# Patient Record
Sex: FEMALE | Race: BLACK OR AFRICAN AMERICAN | NOT HISPANIC OR LATINO | Employment: OTHER | URBAN - METROPOLITAN AREA
[De-identification: names, ages, dates, MRNs, and addresses within clinical notes are randomized per-mention and may not be internally consistent; named-entity substitution may affect disease eponyms.]

---

## 2017-09-14 PROBLEM — H02.886 MEIBOMIAN GLAND DYSFUNCTION (MGD) OF BOTH EYES: Status: ACTIVE | Noted: 2017-09-14

## 2017-09-14 PROBLEM — H02.883 MEIBOMIAN GLAND DYSFUNCTION (MGD) OF BOTH EYES: Status: ACTIVE | Noted: 2017-09-14

## 2017-09-14 PROBLEM — H35.9 WHITE WITHOUT PRESSURE OF PERIPHERAL RETINA OF BOTH EYES: Status: ACTIVE | Noted: 2017-09-14

## 2017-10-18 PROBLEM — D64.9 ANEMIA: Status: ACTIVE | Noted: 2017-10-18

## 2017-10-18 PROBLEM — I77.9 LEFT-SIDED CAROTID ARTERY DISEASE: Status: ACTIVE | Noted: 2017-10-18

## 2017-12-04 PROBLEM — R30.0 DYSURIA: Status: ACTIVE | Noted: 2017-12-04

## 2018-04-09 PROBLEM — K21.9 GERD (GASTROESOPHAGEAL REFLUX DISEASE): Status: ACTIVE | Noted: 2018-04-09

## 2018-05-23 PROBLEM — K62.5 BRIGHT RED BLOOD PER RECTUM: Status: ACTIVE | Noted: 2018-05-23

## 2018-08-17 PROBLEM — Z12.11 SCREENING FOR COLON CANCER: Status: ACTIVE | Noted: 2018-08-17

## 2018-11-05 PROBLEM — R05.3 CHRONIC COUGH: Status: ACTIVE | Noted: 2018-11-05

## 2018-11-05 PROBLEM — I73.9 CLAUDICATION: Status: ACTIVE | Noted: 2018-11-05

## 2018-11-26 PROBLEM — Z00.00 HEALTHCARE MAINTENANCE: Status: ACTIVE | Noted: 2018-11-26

## 2019-02-25 PROBLEM — Z00.00 HEALTHCARE MAINTENANCE: Status: RESOLVED | Noted: 2018-11-26 | Resolved: 2019-02-25

## 2019-04-29 ENCOUNTER — HOSPITAL ENCOUNTER (OUTPATIENT)
Dept: SLEEP MEDICINE | Facility: HOSPITAL | Age: 64
Discharge: HOME | End: 2019-04-29
Attending: INTERNAL MEDICINE
Payer: MEDICARE

## 2019-04-29 DIAGNOSIS — G47.33 OBSTRUCTIVE SLEEP APNEA (ADULT) (PEDIATRIC): ICD-10-CM

## 2019-04-29 PROCEDURE — 95810 PR POLYSOMNOGRAPHY, 4 OR MORE: ICD-10-PCS | Mod: 26,,, | Performed by: INTERNAL MEDICINE

## 2019-04-29 PROCEDURE — 95810 POLYSOM 6/> YRS 4/> PARAM: CPT

## 2019-04-29 PROCEDURE — 95810 POLYSOM 6/> YRS 4/> PARAM: CPT | Mod: 26,,, | Performed by: INTERNAL MEDICINE

## 2019-05-22 PROBLEM — Z13.1 SCREENING FOR DIABETES MELLITUS: Status: ACTIVE | Noted: 2019-05-22

## 2019-05-29 PROBLEM — I50.20 HEART FAILURE WITH REDUCED EJECTION FRACTION, NYHA CLASS III: Status: ACTIVE | Noted: 2019-05-29

## 2019-05-29 PROBLEM — G47.33 OSA ON CPAP: Status: ACTIVE | Noted: 2019-05-29

## 2019-06-02 ENCOUNTER — HOSPITAL ENCOUNTER (OUTPATIENT)
Dept: SLEEP MEDICINE | Facility: HOSPITAL | Age: 64
Discharge: HOME | End: 2019-06-02
Attending: INTERNAL MEDICINE
Payer: MEDICARE

## 2019-06-02 DIAGNOSIS — G47.33 OBSTRUCTIVE SLEEP APNEA (ADULT) (PEDIATRIC): ICD-10-CM

## 2019-06-02 PROCEDURE — 95811 POLYSOM 6/>YRS CPAP 4/> PARM: CPT | Mod: 26,,, | Performed by: INTERNAL MEDICINE

## 2019-06-02 PROCEDURE — 95811 PR POLYSOMNOGRAPHY W/CPAP: ICD-10-PCS | Mod: 26,,, | Performed by: INTERNAL MEDICINE

## 2019-06-02 PROCEDURE — 95811 POLYSOM 6/>YRS CPAP 4/> PARM: CPT

## 2019-08-26 PROBLEM — Z00.00 HEALTHCARE MAINTENANCE: Status: RESOLVED | Noted: 2018-11-26 | Resolved: 2019-08-26

## 2019-08-26 PROBLEM — Z13.1 SCREENING FOR DIABETES MELLITUS: Status: RESOLVED | Noted: 2019-05-22 | Resolved: 2019-08-26

## 2020-01-02 PROBLEM — L03.113 CELLULITIS OF RIGHT FOREARM: Status: ACTIVE | Noted: 2020-01-02

## 2020-01-08 ENCOUNTER — PATIENT OUTREACH (OUTPATIENT)
Dept: ADMINISTRATIVE | Facility: CLINIC | Age: 65
End: 2020-01-08

## 2020-01-08 NOTE — PATIENT INSTRUCTIONS
Discharge Instructions for Cellulitis  You have been diagnosed with cellulitis. This is an infection in the deepest layer of the skin. In some cases, the infection also affects the muscle. Cellulitis is caused by bacteria. The bacteria can enter the body through broken skin. This can happen with a cut, scratch, animal bite, or an insect bite that has been scratched. You may have been treated in the hospital with antibiotics and fluids. You will likely be given a prescription for antibiotics to take at home. This sheet will help you take care of yourself at home.  Home care  When you are home:  · Take the prescribed antibiotic medicine you are given as directed until it is gone. Take it even if you feel better. It treats the infection and stops it from returning. Not taking all the medicine can make future infections hard to treat.  · Keep the infected area clean.  · When possible, raise the infected area above the level of your heart. This helps keep swelling down.  · Talk with your healthcare provider if you are in pain. Ask what kind of over-the-counter medicine you can take for pain.  · Apply clean bandages as advised.  · Take your temperature once a day for a week.  · Wash your hands often to prevent spreading the infection.  In the future, wash your hands before and after you touch cuts, scratches, or bandages. This will help prevent infection.   When to call your healthcare provider  Call your healthcare provider immediately if you have any of the following:  · Difficulty or pain when moving the joints above or below the infected area  · Discharge or pus draining from the area  · Fever of 100.4°F (38°C) or higher, or as directed by your healthcare provider  · Pain that gets worse in or around the infected   · Redness that gets worse in or around the infected area, particularly if the area of redness expands to a wider area  · Shaking chills  · Swelling of the infected area  · Vomiting   Date Last Reviewed:  8/1/2016  © 6314-8388 [employer]. [address] [place] [address], [place] [address]. All rights reserved. This information is not intended as a substitute for professional medical care. Always follow your healthcare professional's instructions.

## 2020-09-03 PROBLEM — Z86.73 HISTORY OF TIA (TRANSIENT ISCHEMIC ATTACK): Status: ACTIVE | Noted: 2020-09-03

## 2020-09-03 PROBLEM — I36.1 NON-RHEUMATIC TRICUSPID VALVE INSUFFICIENCY: Status: ACTIVE | Noted: 2020-09-03

## 2020-09-03 PROBLEM — I77.9 CAROTID ARTERY DISEASE: Status: ACTIVE | Noted: 2020-09-03

## 2020-09-03 PROBLEM — R06.09 DYSPNEA ON EXERTION: Status: ACTIVE | Noted: 2020-09-03

## 2020-09-03 PROBLEM — I25.10 NON-OCCLUSIVE CORONARY ARTERY DISEASE: Status: ACTIVE | Noted: 2020-09-03

## 2020-12-13 ENCOUNTER — SPECIALTY PHARMACY (OUTPATIENT)
Dept: PHARMACY | Facility: CLINIC | Age: 65
End: 2020-12-13

## 2020-12-21 PROBLEM — R55 SYNCOPE: Status: ACTIVE | Noted: 2020-12-21

## 2020-12-27 ENCOUNTER — NURSE TRIAGE (OUTPATIENT)
Dept: ADMINISTRATIVE | Facility: CLINIC | Age: 65
End: 2020-12-27

## 2020-12-27 NOTE — TELEPHONE ENCOUNTER
Patient is calling re ongoing episode of facial numbness,started Monday.She states it happened while I was in the hospital but they did not address. States her blood pressure got high while she was home but then she got better. Patient triaged for facial numbness and on going problem with contractions of hands,  states it has been on going and she was tested at hospital but told her nothing was wrong. Triage done, Care advice to be seen within 4 hours. Stressed to patient option of 911 if worsening S/S. Verb understanding, state one of her children will come and bring her to the ED.     Reason for Disposition   [1] Numbness (i.e., loss of sensation) of the face, arm / hand, or leg / foot on one side of the body AND [2] gradual onset (e.g., days to weeks) AND [3] present now    Additional Information   Negative: [1] SEVERE weakness (i.e., unable to walk or barely able to walk, requires support) AND [2] new onset or worsening   Negative: [1] Weakness (i.e., paralysis, loss of muscle strength) of the face, arm / hand, or leg / foot on one side of the body AND [2] sudden onset AND [3] present now   Negative: [1] Numbness (i.e., loss of sensation) of the face, arm / hand, or leg / foot on one side of the body AND [2] sudden onset AND [3] present now   Negative: [1] Loss of speech or garbled speech AND [2] sudden onset AND [3] present now   Negative: Difficult to awaken or acting confused (e.g., disoriented, slurred speech)   Negative: Sounds like a life-threatening emergency to the triager   Negative: Confusion, disorientation, or hallucinations is main symptom   Negative: Neck pain is main symptom (and having weakness, numbness, or tingling in arm / hand because of neck pain)   Negative: Back pain is main symptom (and having weakness, numbness, or tingling in leg because of back pain)   Negative: Hand pain is main symptom (and having mild weakness, numbness, or tingling in hand related to hand pain)    Negative: Dizziness is main symptom   Negative: Vision loss or change is main symptom   Negative: Followed a head injury within last 3 days   Negative: Followed a neck injury within last 3 days   Negative: [1] Tingling in both hands and/or feet AND [2] breathing faster than normal AND [3] feels similar to prior panic attack or hyperventilation episode   Negative: Weakness in both sides of the body or weakness all over   Negative: Headache  (and neurologic deficit)   Negative: [1] Back pain AND [2] numbness (loss of sensation) in groin or rectal area   Negative: [1] Unable to urinate (or only a few drops) > 4 hours AND [2] bladder feels very full (e.g., palpable bladder or strong urge to urinate)   Negative: [1] Loss of bladder or bowel control (urine or bowel incontinence; wetting self, leaking stool) AND [2] new onset   Negative: [1] Weakness (i.e., paralysis, loss of muscle strength) of the face, arm / hand, or leg / foot on one side of the body AND [2] sudden onset AND [3] brief (now gone)   Negative: [1] Numbness (i.e., loss of sensation) of the face, arm / hand, or leg / foot on one side of the body AND [2] sudden onset AND [3] brief (now gone)   Negative: [1] Loss of speech or garbled speech AND [2] sudden onset AND [3] brief (now gone)   Negative: Bell's palsy suspected (i.e., weakness on only one side of the face, developing over hours to days, no other symptoms)   Negative: Patient sounds very sick or weak to the triager   Negative: Neck pain (and neurologic deficit)   Negative: Back pain (and neurologic deficit)   Negative: [1] Weakness of the face, arm / hand, or leg / foot on one side of the body AND [2] gradual onset (e.g., days to weeks) AND [3] present now    Protocols used: NEUROLOGIC DEFICIT-A-AH

## 2020-12-28 NOTE — TELEPHONE ENCOUNTER
Called the patient, after verification of name and , the patient reports she is feeling better was told to follow up with PCP and cardiology. Informed of the appointment voiced understanding

## 2020-12-30 PROBLEM — I51.89 DIASTOLIC DYSFUNCTION: Status: ACTIVE | Noted: 2020-12-30

## 2020-12-30 PROBLEM — I44.0 AV BLOCK, 1ST DEGREE: Status: ACTIVE | Noted: 2020-12-30

## 2020-12-30 PROBLEM — R20.0 LEFT FACIAL NUMBNESS: Status: ACTIVE | Noted: 2020-12-30

## 2021-01-15 ENCOUNTER — SPECIALTY PHARMACY (OUTPATIENT)
Dept: PHARMACY | Facility: CLINIC | Age: 66
End: 2021-01-15

## 2021-01-21 ENCOUNTER — TELEPHONE (OUTPATIENT)
Dept: PHARMACY | Facility: CLINIC | Age: 66
End: 2021-01-21

## 2021-03-11 ENCOUNTER — PATIENT MESSAGE (OUTPATIENT)
Dept: PHARMACY | Facility: CLINIC | Age: 66
End: 2021-03-11

## 2021-03-18 ENCOUNTER — SPECIALTY PHARMACY (OUTPATIENT)
Dept: PHARMACY | Facility: CLINIC | Age: 66
End: 2021-03-18

## 2021-04-08 ENCOUNTER — SPECIALTY PHARMACY (OUTPATIENT)
Dept: PHARMACY | Facility: CLINIC | Age: 66
End: 2021-04-08

## 2021-05-11 ENCOUNTER — SPECIALTY PHARMACY (OUTPATIENT)
Dept: PHARMACY | Facility: CLINIC | Age: 66
End: 2021-05-11

## 2021-06-03 ENCOUNTER — SPECIALTY PHARMACY (OUTPATIENT)
Dept: PHARMACY | Facility: CLINIC | Age: 66
End: 2021-06-03

## 2021-07-01 ENCOUNTER — PATIENT MESSAGE (OUTPATIENT)
Dept: ADMINISTRATIVE | Facility: OTHER | Age: 66
End: 2021-07-01

## 2021-07-06 ENCOUNTER — SPECIALTY PHARMACY (OUTPATIENT)
Dept: PHARMACY | Facility: CLINIC | Age: 66
End: 2021-07-06

## 2021-08-03 ENCOUNTER — SPECIALTY PHARMACY (OUTPATIENT)
Dept: PHARMACY | Facility: CLINIC | Age: 66
End: 2021-08-03

## 2021-09-25 ENCOUNTER — PATIENT MESSAGE (OUTPATIENT)
Dept: PHARMACY | Facility: CLINIC | Age: 66
End: 2021-09-25

## 2021-09-30 ENCOUNTER — PATIENT MESSAGE (OUTPATIENT)
Dept: PHARMACY | Facility: CLINIC | Age: 66
End: 2021-09-30

## 2021-10-05 ENCOUNTER — SPECIALTY PHARMACY (OUTPATIENT)
Dept: PHARMACY | Facility: CLINIC | Age: 66
End: 2021-10-05

## 2021-10-28 ENCOUNTER — SPECIALTY PHARMACY (OUTPATIENT)
Dept: PHARMACY | Facility: CLINIC | Age: 66
End: 2021-10-28
Payer: MEDICAID

## 2021-11-26 ENCOUNTER — PATIENT MESSAGE (OUTPATIENT)
Dept: PHARMACY | Facility: CLINIC | Age: 66
End: 2021-11-26
Payer: MEDICAID

## 2021-12-21 PROBLEM — D57.20 HEMOGLOBIN S-C DISEASE: Status: ACTIVE | Noted: 2021-12-21

## 2022-01-10 ENCOUNTER — SPECIALTY PHARMACY (OUTPATIENT)
Dept: PHARMACY | Facility: CLINIC | Age: 67
End: 2022-01-10
Payer: MEDICAID

## 2022-01-10 DIAGNOSIS — I50.20 HEART FAILURE WITH REDUCED EJECTION FRACTION, NYHA CLASS III: ICD-10-CM

## 2022-01-10 DIAGNOSIS — Z86.73 HISTORY OF TIA (TRANSIENT ISCHEMIC ATTACK): ICD-10-CM

## 2022-01-10 DIAGNOSIS — E78.5 HYPERLIPIDEMIA, UNSPECIFIED HYPERLIPIDEMIA TYPE: Primary | ICD-10-CM

## 2022-01-10 NOTE — TELEPHONE ENCOUNTER
Specialty Pharmacy - Refill Coordination    Specialty Medication Orders Linked to Encounter    Flowsheet Row Most Recent Value   Medication #1 alirocumab (PRALUENT PEN) 150 mg/mL PnIj (Order#[id number], Rx#[id number])          Refill Questions - Documented Responses    Flowsheet Row Most Recent Value   Patient Availability and HIPAA Verification    Does patient want to proceed with activity? Yes   HIPAA/medical authority confirmed? Yes   Relationship to patient of person spoken to? Self   Refill Screening Questions    Would patient like to speak to a pharmacist? No   When does the patient need to receive the medication? 01/11/22   Refill Delivery Questions    How will the patient receive the medication? Delivery [place]   When does the patient need to receive the medication? 01/11/22   Shipping Address Prescription   Address in [facility] confirmed and updated if neccessary? Yes   Expected Copay ($) 0   Is the patient able to afford the medication copay? Yes   Payment Method zero copay   Days supply of Refill 28   Supplies needed? No supplies needed   Refill activity completed? Yes   Refill activity plan Refill scheduled   Shipment/Pickup Date: 01/11/22          Current Outpatient Medications   Medication Sig    alendronate (FOSAMAX) 10 MG Tab Take 1 tablet (10 mg total) by mouth once daily.    alirocumab (PRALUENT PEN) 150 mg/mL PnIj Inject 2 mL (300 mg total) into the skin as instructed (once a month).    amitriptyline (ELAVIL) 50 MG tablet Take 1 tablet (50 mg total) by mouth every evening.    ammonium lactate (LAC-HYDRIN) 12 % lotion Apply topically 2 (two) times daily. Apply to whole body    ascorbic acid, vitamin C, (VITAMIN C) 1000 MG tablet Take 3,000 mg by mouth once daily.    atorvastatin (LIPITOR) 80 MG tablet Take 1 tablet (80 mg total) by mouth every evening.    calcium carbonate (OS-KODY) 500 mg calcium (1,250 mg) tablet Take 1 tablet (500 mg total) by mouth once daily.    carvediloL (COREG)  12.5 MG tablet Take 1 tablet (12.5 mg total) by mouth 2 (two) times daily with meals.    cholecalciferol, vitamin D3, (VITAMIN D3) 25 mcg (1,000 unit) capsule Take 1,000 Units by mouth once daily. Pt takes 3 tabs daily (3,000) units    clopidogreL (PLAVIX) 75 mg tablet TAKE 1 TABLET BY MOUTH EVERY DAY    cyanocobalamin, vitamin B-12, 50 mcg tablet Take 50 mcg by mouth once daily.    diclofenac sodium (VOLTAREN) 1 % Gel Apply 2 g topically 4 (four) times daily. To finger for 7 days    docusate sodium (COLACE) 100 MG capsule Take 1 capsule (100 mg total) by mouth 2 (two) times daily. (Patient taking differently: Take 100 mg by mouth 2 (two) times daily as needed.)    DULoxetine (CYMBALTA) 30 MG capsule TAKE 1 CAPSULE (30 MG TOTAL) BY MOUTH 2 (TWO) TIMES DAILY.    ezetimibe (ZETIA) 10 mg tablet TAKE 1 TABLET BY MOUTH EVERY DAY    famotidine (PEPCID) 40 MG tablet TAKE 1 TABLET BY MOUTH EVERY DAY    fluticasone furoate-vilanteroL (BREO ELLIPTA) 200-25 mcg/dose DsDv diskus inhaler Inhale 1 puff into the lungs once daily. Controller    furosemide (LASIX) 20 MG tablet Take 1 tablet (20 mg total) by mouth once daily. (Patient taking differently: Take 20 mg by mouth daily as needed.)    hydrocortisone 2.5 % cream Apply to dark patch on leg bid .    irbesartan (AVAPRO) 150 MG tablet Take 1 tablet (150 mg total) by mouth every evening.    isosorbide mononitrate (IMDUR) 30 MG 24 hr tablet Take 30 mg by mouth once daily.    ketoconazole (NIZORAL) 2 % cream Have patient mix ketoconazole cream, triamcinolone cream and milk of magnesia in  1:1:1 ratio.  Patient has sterile jar .  Patient to apply mixture to rash bid prn. (Patient taking differently: Have patient mix ketoconazole cream, triamcinolone cream and milk of magnesia in  1:1:1 ratio.  Patient has sterile jar .  Patient to apply mixture to rash bid prn.)    latanoprost (XALATAN) 0.005 % ophthalmic solution Place 1 drop into both eyes once daily.     "montelukast (SINGULAIR) 10 mg tablet Take 10 mg by mouth nightly.    mv-min-folic-calcium carb-K1 400 mcg-500 mg calcium-20 mcg Tab Take 1 tablet by mouth once daily.    nitroGLYCERIN (NITROSTAT) 0.4 MG SL tablet Place 1 tablet (0.4 mg total) under the tongue every 5 (five) minutes as needed for Chest pain. PLACE 1 TABLET (0.4 MG TOTAL) UNDER THE TONGUE EVERY 5 (FIVE) MINUTES AS NEEDED FOR CHEST PAIN.    PROAIR HFA 90 mcg/actuation inhaler INHALE 2 PUFFS INTO THE LUNGS 4 (FOUR) TIMES DAILY AS NEEDED.    solifenacin (VESICARE) 5 MG tablet TAKE 1 TABLET BY MOUTH EVERY DAY    triamcinolone acetonide 0.1% (KENALOG) 0.1 % cream Have patient mix triamcinolone cream with ketoconazole cream and milk of magnesia in a 1:1:1 ratio.  Patient has sterile jar .  Apply mixture to rash bid prn. (Patient taking differently: Have patient mix triamcinolone cream with ketoconazole cream and milk of magnesia in a 1:1:1 ratio.  Patient has sterile jar .  Apply mixture to rash bid prn.)    vitamin E 100 UNIT capsule Take 100 Units by mouth once daily. Pt states she takes "2000" units daily   Last reviewed on 1/6/2022  8:49 AM by Vicki Carson LPN    Review of patient's allergies indicates:   Allergen Reactions    Aspirin Hives    Clindamycin      rash    Metoprolol tartrate Hives    Ranexa [ranolazine] Other (See Comments)     cough    Strawberry Rash    Tramadol Itching    Last reviewed on  1/6/2022 8:44 AM by Vicki Carson      Tasks added this encounter   No tasks added.   Tasks due within next 3 months   No tasks due.     Patti Reyes, PharmD  [facility]  [address] [place] [address]  Phone: [phone]  Fax: [phone]      "

## 2022-02-02 ENCOUNTER — SPECIALTY PHARMACY (OUTPATIENT)
Dept: PHARMACY | Facility: CLINIC | Age: 67
End: 2022-02-02
Payer: MEDICAID

## 2022-02-02 NOTE — TELEPHONE ENCOUNTER
Specialty Pharmacy - Refill Coordination    Specialty Medication Orders Linked to Encounter    Flowsheet Row Most Recent Value   Medication #1 alirocumab (PRALUENT PEN) 150 mg/mL PnIj (Order#[id number], Rx#[id number])          Refill Questions - Documented Responses    Flowsheet Row Most Recent Value   Patient Availability and HIPAA Verification    Does patient want to proceed with activity? Yes   HIPAA/medical authority confirmed? Yes   Relationship to patient of person spoken to? Self   Refill Screening Questions    Would patient like to speak to a pharmacist? No   When does the patient need to receive the medication? 02/08/22   Refill Delivery Questions    How will the patient receive the medication? Delivery [place]   When does the patient need to receive the medication? 02/08/22   Shipping Address Home   Address in [facility] confirmed and updated if neccessary? Yes   Expected Copay ($) 0   Is the patient able to afford the medication copay? Yes   Payment Method zero copay   Days supply of Refill 28   Supplies needed? No supplies needed   Refill activity completed? Yes   Refill activity plan Refill scheduled   Shipment/Pickup Date: 02/08/22          Current Outpatient Medications   Medication Sig    alendronate (FOSAMAX) 10 MG Tab Take 1 tablet (10 mg total) by mouth once daily.    alirocumab (PRALUENT PEN) 150 mg/mL PnIj Inject 2 mL (300 mg total) into the skin as instructed (once a month).    amitriptyline (ELAVIL) 50 MG tablet Take 1 tablet (50 mg total) by mouth every evening.    ammonium lactate (LAC-HYDRIN) 12 % lotion Apply topically 2 (two) times daily. Apply to whole body    ascorbic acid, vitamin C, (VITAMIN C) 1000 MG tablet Take 3,000 mg by mouth once daily.    atorvastatin (LIPITOR) 80 MG tablet Take 1 tablet (80 mg total) by mouth every evening.    calcium carbonate (OS-KODY) 500 mg calcium (1,250 mg) tablet Take 1 tablet (500 mg total) by mouth once daily.    carvediloL (COREG) 12.5 MG  tablet Take 1 tablet (12.5 mg total) by mouth 2 (two) times daily with meals.    cholecalciferol, vitamin D3, (VITAMIN D3) 25 mcg (1,000 unit) capsule Take 1,000 Units by mouth once daily. Pt takes 3 tabs daily (3,000) units    clopidogreL (PLAVIX) 75 mg tablet TAKE 1 TABLET BY MOUTH EVERY DAY    cyanocobalamin, vitamin B-12, 50 mcg tablet Take 50 mcg by mouth once daily.    diclofenac sodium (VOLTAREN) 1 % Gel Apply 2 g topically 4 (four) times daily. To finger for 7 days    docusate sodium (COLACE) 100 MG capsule Take 1 capsule (100 mg total) by mouth 2 (two) times daily. (Patient taking differently: Take 100 mg by mouth 2 (two) times daily as needed.)    DULoxetine (CYMBALTA) 30 MG capsule TAKE 1 CAPSULE (30 MG TOTAL) BY MOUTH 2 (TWO) TIMES DAILY.    ezetimibe (ZETIA) 10 mg tablet TAKE 1 TABLET BY MOUTH EVERY DAY    famotidine (PEPCID) 40 MG tablet TAKE 1 TABLET BY MOUTH EVERY DAY    fluticasone furoate-vilanteroL (BREO ELLIPTA) 200-25 mcg/dose DsDv diskus inhaler Inhale 1 puff into the lungs once daily. Controller    furosemide (LASIX) 20 MG tablet Take 1 tablet (20 mg total) by mouth once daily. (Patient taking differently: Take 20 mg by mouth daily as needed.)    hydrocortisone 2.5 % cream Apply to dark patch on leg bid .    irbesartan (AVAPRO) 150 MG tablet Take 1 tablet (150 mg total) by mouth every evening.    isosorbide mononitrate (IMDUR) 30 MG 24 hr tablet Take 30 mg by mouth once daily.    ketoconazole (NIZORAL) 2 % cream Have patient mix ketoconazole cream, triamcinolone cream and milk of magnesia in  1:1:1 ratio.  Patient has sterile jar .  Patient to apply mixture to rash bid prn. (Patient taking differently: Have patient mix ketoconazole cream, triamcinolone cream and milk of magnesia in  1:1:1 ratio.  Patient has sterile jar .  Patient to apply mixture to rash bid prn.)    latanoprost (XALATAN) 0.005 % ophthalmic solution Place 1 drop into both eyes once daily.    montelukast  "(SINGULAIR) 10 mg tablet Take 10 mg by mouth nightly.    mv-min-folic-calcium carb-K1 400 mcg-500 mg calcium-20 mcg Tab Take 1 tablet by mouth once daily.    nitroGLYCERIN (NITROSTAT) 0.4 MG SL tablet Place 1 tablet (0.4 mg total) under the tongue every 5 (five) minutes as needed for Chest pain. PLACE 1 TABLET (0.4 MG TOTAL) UNDER THE TONGUE EVERY 5 (FIVE) MINUTES AS NEEDED FOR CHEST PAIN.    PROAIR HFA 90 mcg/actuation inhaler INHALE 2 PUFFS INTO THE LUNGS 4 (FOUR) TIMES DAILY AS NEEDED.    solifenacin (VESICARE) 5 MG tablet TAKE 1 TABLET BY MOUTH EVERY DAY    triamcinolone acetonide 0.1% (KENALOG) 0.1 % cream Have patient mix triamcinolone cream with ketoconazole cream and milk of magnesia in a 1:1:1 ratio.  Patient has sterile jar .  Apply mixture to rash bid prn. (Patient taking differently: Have patient mix triamcinolone cream with ketoconazole cream and milk of magnesia in a 1:1:1 ratio.  Patient has sterile jar .  Apply mixture to rash bid prn.)    vitamin E 100 UNIT capsule Take 100 Units by mouth once daily. Pt states she takes "2000" units daily   Last reviewed on 1/6/2022  8:49 AM by Vicki Carson LPN    Review of patient's allergies indicates:   Allergen Reactions    Aspirin Hives    Clindamycin      rash    Metoprolol tartrate Hives    Ranexa [ranolazine] Other (See Comments)     cough    Strawberry Rash    Tramadol Itching    Last reviewed on  1/6/2022 8:44 AM by Vicki Carson      Tasks added this encounter   3/1/2022 - Refill Call (Auto Added)   Tasks due within next 3 months   No tasks due.     Christal Panda [facility]  [address] [place] [id number]  Phone: [phone]  Fax: [phone]      "

## 2022-03-08 ENCOUNTER — SPECIALTY PHARMACY (OUTPATIENT)
Dept: PHARMACY | Facility: CLINIC | Age: 67
End: 2022-03-08
Payer: MEDICAID

## 2022-03-08 NOTE — TELEPHONE ENCOUNTER
Specialty Pharmacy - Refill Coordination    Specialty Medication Orders Linked to Encounter    Flowsheet Row Most Recent Value   Medication #1 alirocumab (PRALUENT PEN) 150 mg/mL PnIj (Order#[id number], Rx#[id number])          Refill Questions - Documented Responses    Flowsheet Row Most Recent Value   Patient Availability and HIPAA Verification    Does patient want to proceed with activity? Yes   HIPAA/medical authority confirmed? Yes   Relationship to patient of person spoken to? Self   Refill Screening Questions    Would patient like to speak to a pharmacist? No   When does the patient need to receive the medication? 03/15/22   Refill Delivery Questions    How will the patient receive the medication? Delivery [place]   When does the patient need to receive the medication? 03/15/22   Shipping Address Prescription   Address in [facility] confirmed and updated if neccessary? Yes   Expected Copay ($) 0   Is the patient able to afford the medication copay? Yes   Payment Method zero copay   Days supply of Refill 28   Supplies needed? No supplies needed   Refill activity completed? Yes   Refill activity plan Refill scheduled   Shipment/Pickup Date: 03/10/22          Current Outpatient Medications   Medication Sig    acetaminophen (TYLENOL) 650 MG TbSR Take 650 mg by mouth every 8 (eight) hours.    acetaminophen 500 mg PwPk Take by mouth.    alendronate (FOSAMAX) 10 MG Tab Take 1 tablet (10 mg total) by mouth once daily.    alirocumab (PRALUENT PEN) 150 mg/mL PnIj Inject 2 mL (300 mg total) into the skin as instructed (once a month).    amitriptyline (ELAVIL) 50 MG tablet Take 1 tablet (50 mg total) by mouth every evening.    ammonium lactate (LAC-HYDRIN) 12 % lotion Apply topically 2 (two) times daily. Apply to whole body    ascorbic acid, vitamin C, (VITAMIN C) 1000 MG tablet Take 3,000 mg by mouth once daily.    atorvastatin (LIPITOR) 80 MG tablet Take 1 tablet (80 mg total) by mouth every evening.     calcium carbonate (OS-KODY) 500 mg calcium (1,250 mg) tablet Take 1 tablet (500 mg total) by mouth once daily.    carvediloL (COREG) 12.5 MG tablet Take 1 tablet (12.5 mg total) by mouth 2 (two) times daily with meals.    cholecalciferol, vitamin D3, (VITAMIN D3) 25 mcg (1,000 unit) capsule Take 1,000 Units by mouth once daily. Pt takes 3 tabs daily (3,000) units    clopidogreL (PLAVIX) 75 mg tablet TAKE 1 TABLET BY MOUTH EVERY DAY    cyanocobalamin, vitamin B-12, 50 mcg tablet Take 50 mcg by mouth once daily.    diclofenac sodium (VOLTAREN) 1 % Gel Apply 2 g topically 4 (four) times daily. To finger for 7 days    docusate sodium (COLACE) 100 MG capsule Take 1 capsule (100 mg total) by mouth 2 (two) times daily. (Patient taking differently: Take 100 mg by mouth 2 (two) times daily as needed.)    DULoxetine (CYMBALTA) 30 MG capsule TAKE 1 CAPSULE (30 MG TOTAL) BY MOUTH 2 (TWO) TIMES DAILY.    ezetimibe (ZETIA) 10 mg tablet TAKE 1 TABLET BY MOUTH EVERY DAY    famotidine (PEPCID) 40 MG tablet TAKE 1 TABLET BY MOUTH EVERY DAY    fluticasone furoate-vilanteroL (BREO ELLIPTA) 200-25 mcg/dose DsDv diskus inhaler Inhale 1 puff into the lungs once daily. Controller    furosemide (LASIX) 20 MG tablet Take 1 tablet (20 mg total) by mouth daily as needed (Fluid overload).    hydrocortisone 2.5 % cream Apply to dark patch on leg bid .    irbesartan (AVAPRO) 150 MG tablet Take 1 tablet (150 mg total) by mouth every evening.    isosorbide mononitrate (IMDUR) 30 MG 24 hr tablet Take 30 mg by mouth once daily.    ketoconazole (NIZORAL) 2 % cream Have patient mix ketoconazole cream, triamcinolone cream and milk of magnesia in  1:1:1 ratio.  Patient has sterile jar .  Patient to apply mixture to rash bid prn. (Patient taking differently: Have patient mix ketoconazole cream, triamcinolone cream and milk of magnesia in  1:1:1 ratio.  Patient has sterile jar .  Patient to apply mixture to rash bid prn.)    latanoprost  "(XALATAN) 0.005 % ophthalmic solution Place 1 drop into both eyes once daily.    montelukast (SINGULAIR) 10 mg tablet Take 10 mg by mouth nightly.    mv-min-folic-calcium carb-K1 400 mcg-500 mg calcium-20 mcg Tab Take 1 tablet by mouth once daily.    nitroGLYCERIN (NITROSTAT) 0.4 MG SL tablet Place 1 tablet (0.4 mg total) under the tongue every 5 (five) minutes as needed for Chest pain. PLACE 1 TABLET (0.4 MG TOTAL) UNDER THE TONGUE EVERY 5 (FIVE) MINUTES AS NEEDED FOR CHEST PAIN.    PROAIR HFA 90 mcg/actuation inhaler INHALE 2 PUFFS INTO THE LUNGS 4 (FOUR) TIMES DAILY AS NEEDED.    solifenacin (VESICARE) 5 MG tablet TAKE 1 TABLET BY MOUTH EVERY DAY    triamcinolone acetonide 0.1% (KENALOG) 0.1 % cream Have patient mix triamcinolone cream with ketoconazole cream and milk of magnesia in a 1:1:1 ratio.  Patient has sterile jar .  Apply mixture to rash bid prn. (Patient taking differently: Have patient mix triamcinolone cream with ketoconazole cream and milk of magnesia in a 1:1:1 ratio.  Patient has sterile jar .  Apply mixture to rash bid prn.)    vitamin E 100 UNIT capsule Take 100 Units by mouth once daily. Pt states she takes "2000" units daily   Last reviewed on 3/7/2022 10:53 AM by Valerie Ramirez MA    Review of patient's allergies indicates:   Allergen Reactions    Aspirin Hives    Clindamycin      rash    Metoprolol tartrate Hives    Ranexa [ranolazine] Other (See Comments)     cough    Strawberry Rash    Tramadol Itching    Last reviewed on  3/7/2022 10:51 AM by Valerie Ramirez      Tasks added this encounter   4/5/2022 - Refill Call (Auto Added)   Tasks due within next 3 months   No tasks due.     Keri Hinojosa, PharmD  [facility]  [address] [place] [address]  Phone: [phone]  Fax: [phone]      "

## 2022-03-15 PROBLEM — I51.7 LEFT ATRIAL ENLARGEMENT: Status: ACTIVE | Noted: 2022-03-15

## 2022-03-15 PROBLEM — E66.813 CLASS 3 SEVERE OBESITY DUE TO EXCESS CALORIES WITH SERIOUS COMORBIDITY AND BODY MASS INDEX (BMI) OF 50.0 TO 59.9 IN ADULT: Status: ACTIVE | Noted: 2022-03-15

## 2022-03-15 PROBLEM — I73.9 CLAUDICATION: Status: RESOLVED | Noted: 2018-11-05 | Resolved: 2022-03-15

## 2022-03-15 PROBLEM — I10 ESSENTIAL HYPERTENSION: Status: ACTIVE | Noted: 2022-03-15

## 2022-03-15 PROBLEM — J98.4 PULMONARY INSUFFICIENCY: Status: ACTIVE | Noted: 2022-03-15

## 2022-03-15 PROBLEM — E66.01 CLASS 3 SEVERE OBESITY DUE TO EXCESS CALORIES WITH SERIOUS COMORBIDITY AND BODY MASS INDEX (BMI) OF 50.0 TO 59.9 IN ADULT: Status: ACTIVE | Noted: 2022-03-15

## 2022-03-15 PROBLEM — I77.9 LEFT-SIDED CAROTID ARTERY DISEASE: Status: RESOLVED | Noted: 2017-10-18 | Resolved: 2022-03-15

## 2022-03-15 PROBLEM — D64.9 ANEMIA: Status: RESOLVED | Noted: 2017-10-18 | Resolved: 2022-03-15

## 2022-04-18 ENCOUNTER — SPECIALTY PHARMACY (OUTPATIENT)
Dept: PHARMACY | Facility: CLINIC | Age: 67
End: 2022-04-18
Payer: MEDICAID

## 2022-04-18 ENCOUNTER — PATIENT MESSAGE (OUTPATIENT)
Dept: ADMINISTRATIVE | Facility: OTHER | Age: 67
End: 2022-04-18
Payer: MEDICAID

## 2022-04-18 NOTE — TELEPHONE ENCOUNTER
Specialty Pharmacy - Refill Coordination    Specialty Medication Orders Linked to Encounter    Flowsheet Row Most Recent Value   Medication #1 alirocumab (PRALUENT PEN) 150 mg/mL PnIj (Order#[id number], Rx#[id number])        Refill Questions - Documented Responses    Flowsheet Row Most Recent Value   Patient Availability and HIPAA Verification    Does patient want to proceed with activity? Yes   HIPAA/medical authority confirmed? Yes   Relationship to patient of person spoken to? Self   Refill Screening Questions    Would patient like to speak to a pharmacist? No   When does the patient need to receive the medication? 04/19/22   Refill Delivery Questions    How will the patient receive the medication? Delivery [place]   When does the patient need to receive the medication? 04/19/22   Shipping Address Home   Address in [facility] confirmed and updated if neccessary? Yes   Expected Copay ($) 0   Is the patient able to afford the medication copay? Yes   Payment Method zero copay   Days supply of Refill 28   Supplies needed? No supplies needed   Refill activity completed? Yes   Refill activity plan Refill scheduled   Shipment/Pickup Date: 04/19/22        Current Outpatient Medications   Medication Sig    acetaminophen (TYLENOL) 650 MG TbSR Take 650 mg by mouth every 8 (eight) hours.    acetaminophen 500 mg PwPk Take by mouth.    alendronate (FOSAMAX) 10 MG Tab Take 1 tablet (10 mg total) by mouth once daily.    alirocumab (PRALUENT PEN) 150 mg/mL PnIj Inject 2 mL (300 mg total) into the skin as instructed (once a month).    amitriptyline (ELAVIL) 50 MG tablet Take 1 tablet (50 mg total) by mouth every evening.    ammonium lactate (LAC-HYDRIN) 12 % lotion Apply topically 2 (two) times daily. Apply to whole body    ascorbic acid, vitamin C, (VITAMIN C) 1000 MG tablet Take 3,000 mg by mouth once daily.    atorvastatin (LIPITOR) 80 MG tablet Take 1 tablet (80 mg total) by mouth every evening.    calcium  carbonate (OS-KODY) 500 mg calcium (1,250 mg) tablet Take 1 tablet (500 mg total) by mouth once daily.    carvediloL (COREG) 12.5 MG tablet Take 1 tablet (12.5 mg total) by mouth 2 (two) times daily with meals.    cholecalciferol, vitamin D3, (VITAMIN D3) 25 mcg (1,000 unit) capsule Take 1,000 Units by mouth once daily. Pt takes 3 tabs daily (3,000) units    clopidogreL (PLAVIX) 75 mg tablet Take 1 tablet (75 mg total) by mouth once daily.    cyanocobalamin, vitamin B-12, 50 mcg tablet Take 50 mcg by mouth once daily.    diclofenac sodium (VOLTAREN) 1 % Gel Apply 2 g topically 4 (four) times daily. To finger for 7 days    docusate sodium (COLACE) 100 MG capsule Take 1 capsule (100 mg total) by mouth 2 (two) times daily. (Patient taking differently: Take 100 mg by mouth 2 (two) times daily as needed.)    DULoxetine (CYMBALTA) 30 MG capsule TAKE 1 CAPSULE (30 MG TOTAL) BY MOUTH 2 (TWO) TIMES DAILY.    ezetimibe (ZETIA) 10 mg tablet Take 1 tablet (10 mg total) by mouth once daily.    famotidine (PEPCID) 40 MG tablet TAKE 1 TABLET BY MOUTH EVERY DAY    fluticasone furoate-vilanteroL (BREO ELLIPTA) 200-25 mcg/dose DsDv diskus inhaler Inhale 1 puff into the lungs once daily. Controller    furosemide (LASIX) 20 MG tablet Take 1 tablet (20 mg total) by mouth daily as needed (Fluid overload).    hydrocortisone 2.5 % cream Apply to dark patch on leg bid .    irbesartan (AVAPRO) 150 MG tablet Take 1 tablet (150 mg total) by mouth every evening.    isosorbide mononitrate (IMDUR) 30 MG 24 hr tablet Take 1 tablet (30 mg total) by mouth once daily.    ketoconazole (NIZORAL) 2 % cream Have patient mix ketoconazole cream, triamcinolone cream and milk of magnesia in  1:1:1 ratio.  Patient has sterile jar .  Patient to apply mixture to rash bid prn. (Patient taking differently: Have patient mix ketoconazole cream, triamcinolone cream and milk of magnesia in  1:1:1 ratio.  Patient has sterile jar .  Patient to apply  "mixture to rash bid prn.)    latanoprost (XALATAN) 0.005 % ophthalmic solution Place 1 drop into both eyes once daily.    montelukast (SINGULAIR) 10 mg tablet Take 10 mg by mouth nightly.    mv-min-folic-calcium carb-K1 400 mcg-500 mg calcium-20 mcg Tab Take 1 tablet by mouth once daily.    nitroGLYCERIN (NITROSTAT) 0.4 MG SL tablet Place 1 tablet (0.4 mg total) under the tongue every 5 (five) minutes as needed for Chest pain. PLACE 1 TABLET (0.4 MG TOTAL) UNDER THE TONGUE EVERY 5 (FIVE) MINUTES AS NEEDED FOR CHEST PAIN.    PROAIR HFA 90 mcg/actuation inhaler INHALE 2 PUFFS INTO THE LUNGS 4 (FOUR) TIMES DAILY AS NEEDED.    solifenacin (VESICARE) 5 MG tablet TAKE 1 TABLET BY MOUTH EVERY DAY    triamcinolone acetonide 0.1% (KENALOG) 0.1 % cream Have patient mix triamcinolone cream with ketoconazole cream and milk of magnesia in a 1:1:1 ratio.  Patient has sterile jar .  Apply mixture to rash bid prn. (Patient taking differently: Have patient mix triamcinolone cream with ketoconazole cream and milk of magnesia in a 1:1:1 ratio.  Patient has sterile jar .  Apply mixture to rash bid prn.)    vitamin E 100 UNIT capsule Take 100 Units by mouth once daily. Pt states she takes "2000" units daily   Last reviewed on 3/28/2022 11:29 AM by Valerie Ramirez MA    Review of patient's allergies indicates:   Allergen Reactions    Aspirin Hives    Clindamycin      rash    Metoprolol tartrate Hives    Ranexa [ranolazine] Other (See Comments)     cough    Strawberry Rash    Tramadol Itching    Last reviewed on  3/28/2022 11:29 AM by Valerie Ramirez      Tasks added this encounter   5/10/2022 - Refill Call (Auto Added)   Tasks due within next 3 months   No tasks due.     Mukesh Aldrich, PharmD  [facility]  [facility] [address]  [facility] [address]  Phone: [phone]  Fax: [phone]      "

## 2022-05-10 ENCOUNTER — SPECIALTY PHARMACY (OUTPATIENT)
Dept: PHARMACY | Facility: CLINIC | Age: 67
End: 2022-05-10
Payer: MEDICAID

## 2022-05-10 NOTE — TELEPHONE ENCOUNTER
Specialty Pharmacy - Refill Coordination    Specialty Medication Orders Linked to Encounter    Flowsheet Row Most Recent Value   Medication #1 alirocumab (PRALUENT PEN) 150 mg/mL PnIj (Order#[id number], Rx#[id number])          Refill Questions - Documented Responses    Flowsheet Row Most Recent Value   Patient Availability and HIPAA Verification    Does patient want to proceed with activity? Yes   HIPAA/medical authority confirmed? Yes   Relationship to patient of person spoken to? Self   Refill Screening Questions    Would patient like to speak to a pharmacist? No   When does the patient need to receive the medication? 05/15/22   Refill Delivery Questions    How will the patient receive the medication? Delivery [place]   When does the patient need to receive the medication? 05/15/22   Shipping Address Home   Address in [facility] confirmed and updated if neccessary? Yes   Expected Copay ($) 0   Is the patient able to afford the medication copay? Yes   Payment Method zero copay   Days supply of Refill 28   Refill activity completed? Yes   Refill activity plan Refill scheduled   Shipment/Pickup Date: 05/13/22          Current Outpatient Medications   Medication Sig    acetaminophen (TYLENOL) 650 MG TbSR Take 650 mg by mouth every 8 (eight) hours.    acetaminophen 500 mg PwPk Take by mouth.    alendronate (FOSAMAX) 10 MG Tab Take 1 tablet (10 mg total) by mouth once daily.    alirocumab (PRALUENT PEN) 150 mg/mL PnIj Inject 2 mL (300 mg total) into the skin as instructed (once a month).    amitriptyline (ELAVIL) 50 MG tablet Take 1 tablet (50 mg total) by mouth every evening.    ammonium lactate (LAC-HYDRIN) 12 % lotion Apply topically 2 (two) times daily. Apply to whole body    ascorbic acid, vitamin C, (VITAMIN C) 1000 MG tablet Take 3,000 mg by mouth once daily.    atorvastatin (LIPITOR) 80 MG tablet Take 1 tablet (80 mg total) by mouth every evening.    calcium carbonate (OS-KODY) 500 mg calcium (1,250  mg) tablet Take 1 tablet (500 mg total) by mouth once daily.    carvediloL (COREG) 12.5 MG tablet Take 1 tablet (12.5 mg total) by mouth 2 (two) times daily with meals.    cholecalciferol, vitamin D3, (VITAMIN D3) 25 mcg (1,000 unit) capsule Take 1,000 Units by mouth once daily. Pt takes 3 tabs daily (3,000) units    clopidogreL (PLAVIX) 75 mg tablet Take 1 tablet (75 mg total) by mouth once daily.    cyanocobalamin, vitamin B-12, 50 mcg tablet Take 50 mcg by mouth once daily.    diclofenac sodium (VOLTAREN) 1 % Gel Apply 2 g topically 4 (four) times daily. To finger for 7 days    docusate sodium (COLACE) 100 MG capsule Take 1 capsule (100 mg total) by mouth 2 (two) times daily. (Patient taking differently: Take 100 mg by mouth 2 (two) times daily as needed.)    DULoxetine (CYMBALTA) 30 MG capsule TAKE 1 CAPSULE (30 MG TOTAL) BY MOUTH 2 (TWO) TIMES DAILY.    ezetimibe (ZETIA) 10 mg tablet Take 1 tablet (10 mg total) by mouth once daily.    famotidine (PEPCID) 40 MG tablet TAKE 1 TABLET BY MOUTH EVERY DAY    fluticasone furoate-vilanteroL (BREO ELLIPTA) 200-25 mcg/dose DsDv diskus inhaler Inhale 1 puff into the lungs once daily. Controller    furosemide (LASIX) 20 MG tablet Take 1 tablet (20 mg total) by mouth daily as needed (Fluid overload).    hydrocortisone 2.5 % cream Apply to dark patch on leg bid .    irbesartan (AVAPRO) 150 MG tablet Take 1 tablet (150 mg total) by mouth every evening.    isosorbide mononitrate (IMDUR) 30 MG 24 hr tablet Take 1 tablet (30 mg total) by mouth once daily.    ketoconazole (NIZORAL) 2 % cream Have patient mix ketoconazole cream, triamcinolone cream and milk of magnesia in  1:1:1 ratio.  Patient has sterile jar .  Patient to apply mixture to rash bid prn. (Patient taking differently: Have patient mix ketoconazole cream, triamcinolone cream and milk of magnesia in  1:1:1 ratio.  Patient has sterile jar .  Patient to apply mixture to rash bid prn.)    latanoprost  "(XALATAN) 0.005 % ophthalmic solution Place 1 drop into both eyes once daily.    montelukast (SINGULAIR) 10 mg tablet Take 10 mg by mouth nightly.    mv-min-folic-calcium carb-K1 400 mcg-500 mg calcium-20 mcg Tab Take 1 tablet by mouth once daily.    nitroGLYCERIN (NITROSTAT) 0.4 MG SL tablet Place 1 tablet (0.4 mg total) under the tongue every 5 (five) minutes as needed for Chest pain. PLACE 1 TABLET (0.4 MG TOTAL) UNDER THE TONGUE EVERY 5 (FIVE) MINUTES AS NEEDED FOR CHEST PAIN.    PROAIR HFA 90 mcg/actuation inhaler INHALE 2 PUFFS INTO THE LUNGS 4 (FOUR) TIMES DAILY AS NEEDED.    solifenacin (VESICARE) 5 MG tablet TAKE 1 TABLET BY MOUTH EVERY DAY    triamcinolone acetonide 0.1% (KENALOG) 0.1 % cream Have patient mix triamcinolone cream with ketoconazole cream and milk of magnesia in a 1:1:1 ratio.  Patient has sterile jar .  Apply mixture to rash bid prn. (Patient taking differently: Have patient mix triamcinolone cream with ketoconazole cream and milk of magnesia in a 1:1:1 ratio.  Patient has sterile jar .  Apply mixture to rash bid prn.)    vitamin E 100 UNIT capsule Take 100 Units by mouth once daily. Pt states she takes "2000" units daily   Last reviewed on 3/28/2022 11:29 AM by Valerie Ramirez MA    Review of patient's allergies indicates:   Allergen Reactions    Aspirin Hives    Clindamycin      rash    Metoprolol tartrate Hives    Ranexa [ranolazine] Other (See Comments)     cough    Strawberry Rash    Tramadol Itching    Last reviewed on  3/28/2022 11:29 AM by Valerie Ramirez      Tasks added this encounter   6/5/2022 - Refill Call (Auto Added)   Tasks due within next 3 months   No tasks due.     Elyssa Vázquez, PharmD  [facility]  [address] [place] [address]  Phone: [phone]  Fax: [phone]      "

## 2022-06-14 ENCOUNTER — SPECIALTY PHARMACY (OUTPATIENT)
Dept: PHARMACY | Facility: CLINIC | Age: 67
End: 2022-06-14
Payer: MEDICAID

## 2022-06-14 NOTE — TELEPHONE ENCOUNTER
Specialty Pharmacy - Refill Coordination    Specialty Medication Orders Linked to Encounter    Flowsheet Row Most Recent Value   Medication #1 alirocumab (PRALUENT PEN) 150 mg/mL PnIj (Order#[id number], Rx#[id number])          Refill Questions - Documented Responses    Flowsheet Row Most Recent Value   Patient Availability and HIPAA Verification    Does patient want to proceed with activity? Yes   HIPAA/medical authority confirmed? Yes   Relationship to patient of person spoken to? Self   Refill Screening Questions    Would patient like to speak to a pharmacist? No   When does the patient need to receive the medication? 06/18/22   Refill Delivery Questions    How will the patient receive the medication? Delivery [place]   When does the patient need to receive the medication? 06/18/22   Shipping Address Prescription   Address in [facility] confirmed and updated if neccessary? Yes   Expected Copay ($) 0   Is the patient able to afford the medication copay? Yes   Payment Method zero copay   Days supply of Refill 28   Supplies needed? No supplies needed   Refill activity completed? Yes   Refill activity plan Refill scheduled   Shipment/Pickup Date: 06/16/22          Current Outpatient Medications   Medication Sig    acetaminophen (TYLENOL) 650 MG TbSR Take 650 mg by mouth every 8 (eight) hours.    alendronate (FOSAMAX) 10 MG Tab Take 1 tablet (10 mg total) by mouth once daily.    alirocumab (PRALUENT PEN) 150 mg/mL PnIj Inject 2 mL (300 mg total) into the skin as instructed (once a month).    amitriptyline (ELAVIL) 50 MG tablet Take 1 tablet (50 mg total) by mouth every evening.    ammonium lactate (LAC-HYDRIN) 12 % lotion Apply topically 2 (two) times daily. Apply to whole body    ascorbic acid, vitamin C, (VITAMIN C) 1000 MG tablet Take 3,000 mg by mouth once daily.    atorvastatin (LIPITOR) 80 MG tablet Take 1 tablet (80 mg total) by mouth every evening.    calcium carbonate (OS-KODY) 500 mg calcium (1,250  mg) tablet Take 1 tablet (500 mg total) by mouth once daily.    carvediloL (COREG) 12.5 MG tablet Take 1 tablet (12.5 mg total) by mouth 2 (two) times daily with meals.    cholecalciferol, vitamin D3, (VITAMIN D3) 25 mcg (1,000 unit) capsule Take 1,000 Units by mouth once daily. Pt takes 3 tabs daily (3,000) units    clopidogreL (PLAVIX) 75 mg tablet Take 1 tablet (75 mg total) by mouth once daily.    cyanocobalamin, vitamin B-12, 50 mcg tablet Take 50 mcg by mouth once daily.    diclofenac sodium (VOLTAREN) 1 % Gel Apply 2 g topically 4 (four) times daily. To finger for 7 days    docusate sodium (COLACE) 100 MG capsule Take 1 capsule (100 mg total) by mouth 2 (two) times daily. (Patient taking differently: Take 100 mg by mouth 2 (two) times daily as needed.)    DULoxetine (CYMBALTA) 30 MG capsule TAKE 1 CAPSULE (30 MG TOTAL) BY MOUTH 2 (TWO) TIMES DAILY.    ezetimibe (ZETIA) 10 mg tablet Take 1 tablet (10 mg total) by mouth once daily.    famotidine (PEPCID) 40 MG tablet TAKE 1 TABLET BY MOUTH EVERY DAY    fluticasone furoate-vilanteroL (BREO ELLIPTA) 200-25 mcg/dose DsDv diskus inhaler Inhale 1 puff into the lungs once daily. Controller    furosemide (LASIX) 20 MG tablet Take 1 tablet (20 mg total) by mouth daily as needed (Fluid overload).    hydrocortisone 2.5 % cream Apply to dark patch on leg bid .    irbesartan (AVAPRO) 150 MG tablet Take 1 tablet (150 mg total) by mouth every evening.    isosorbide mononitrate (IMDUR) 30 MG 24 hr tablet Take 1 tablet (30 mg total) by mouth once daily.    ketoconazole (NIZORAL) 2 % cream Have patient mix ketoconazole cream, triamcinolone cream and milk of magnesia in  1:1:1 ratio.  Patient has sterile jar .  Patient to apply mixture to rash bid prn. (Patient taking differently: Have patient mix ketoconazole cream, triamcinolone cream and milk of magnesia in  1:1:1 ratio.  Patient has sterile jar .  Patient to apply mixture to rash bid prn.)    latanoprost  "(XALATAN) 0.005 % ophthalmic solution Place 1 drop into both eyes once daily.    montelukast (SINGULAIR) 10 mg tablet Take 10 mg by mouth nightly.    mv-min-folic-calcium carb-K1 400 mcg-500 mg calcium-20 mcg Tab Take 1 tablet by mouth once daily.    nitroGLYCERIN (NITROSTAT) 0.4 MG SL tablet Place 1 tablet (0.4 mg total) under the tongue every 5 (five) minutes as needed for Chest pain. PLACE 1 TABLET (0.4 MG TOTAL) UNDER THE TONGUE EVERY 5 (FIVE) MINUTES AS NEEDED FOR CHEST PAIN.    PROAIR HFA 90 mcg/actuation inhaler INHALE 2 PUFFS INTO THE LUNGS 4 (FOUR) TIMES DAILY AS NEEDED.    solifenacin (VESICARE) 5 MG tablet TAKE 1 TABLET BY MOUTH EVERY DAY    triamcinolone acetonide 0.1% (KENALOG) 0.1 % cream Have patient mix triamcinolone cream with ketoconazole cream and milk of magnesia in a 1:1:1 ratio.  Patient has sterile jar .  Apply mixture to rash bid prn. (Patient taking differently: Have patient mix triamcinolone cream with ketoconazole cream and milk of magnesia in a 1:1:1 ratio.  Patient has sterile jar .  Apply mixture to rash bid prn.)    vitamin E 100 UNIT capsule Take 100 Units by mouth once daily. Pt states she takes "2000" units daily   Last reviewed on 6/14/2022 10:01 AM by Flor Walker MA    Review of patient's allergies indicates:   Allergen Reactions    Aspirin Hives    Clindamycin      rash    Metoprolol tartrate Hives    Ranexa [ranolazine] Other (See Comments)     cough    Strawberry Rash    Tramadol Itching    Last reviewed on  6/14/2022 10:01 AM by Flor Walker      Tasks added this encounter   No tasks added.   Tasks due within next 3 months   6/5/2022 - Refill Call (Auto Added)     Antonia Garcia, PharmD  [facility]  [address] [place] [id number]  Phone: [phone]  Fax: [phone]      "

## 2022-07-14 ENCOUNTER — SPECIALTY PHARMACY (OUTPATIENT)
Dept: PHARMACY | Facility: CLINIC | Age: 67
End: 2022-07-14
Payer: MEDICAID

## 2022-07-14 NOTE — TELEPHONE ENCOUNTER
Pt transferred to [facility] during call she had question on Praluent storage. Addressed in ivent.

## 2022-07-14 NOTE — TELEPHONE ENCOUNTER
Specialty Pharmacy - Refill Coordination    Specialty Medication Orders Linked to Encounter    Flowsheet Row Most Recent Value   Medication #1 alirocumab (PRALUENT PEN) 150 mg/mL PnIj (Order#[id number], Rx#[id number])          Refill Questions - Documented Responses    Flowsheet Row Most Recent Value   Patient Availability and HIPAA Verification    Does patient want to proceed with activity? Yes   HIPAA/medical authority confirmed? Yes   Relationship to patient of person spoken to? Self   Refill Screening Questions    Would patient like to speak to a pharmacist? No   When does the patient need to receive the medication? 07/19/22   Refill Delivery Questions    How will the patient receive the medication? Delivery [place]   When does the patient need to receive the medication? 07/19/22   Shipping Address Prescription   Address in [facility] confirmed and updated if neccessary? Yes   Expected Copay ($) 0   Is the patient able to afford the medication copay? Yes   Payment Method zero copay   Days supply of Refill 28   Supplies needed? No supplies needed   Refill activity completed? Yes   Refill activity plan Refill scheduled   Shipment/Pickup Date: 07/18/22          Current Outpatient Medications   Medication Sig    acetaminophen (TYLENOL) 650 MG TbSR Take 650 mg by mouth every 8 (eight) hours.    alendronate (FOSAMAX) 10 MG Tab Take 1 tablet (10 mg total) by mouth once daily.    alirocumab (PRALUENT PEN) 150 mg/mL PnIj Inject 2 mL (300 mg total) into the skin as instructed (once a month).    amitriptyline (ELAVIL) 50 MG tablet Take 1 tablet (50 mg total) by mouth every evening.    ammonium lactate (LAC-HYDRIN) 12 % lotion Apply topically 2 (two) times daily. Apply to whole body    ascorbic acid, vitamin C, (VITAMIN C) 1000 MG tablet Take 3,000 mg by mouth once daily.    atorvastatin (LIPITOR) 80 MG tablet Take 1 tablet (80 mg total) by mouth every evening.    calcium carbonate (OS-KODY) 500 mg calcium (1,250  mg) tablet Take 1 tablet (500 mg total) by mouth once daily.    carvediloL (COREG) 12.5 MG tablet Take 1 tablet (12.5 mg total) by mouth 2 (two) times daily with meals.    cholecalciferol, vitamin D3, (VITAMIN D3) 25 mcg (1,000 unit) capsule Take 1,000 Units by mouth once daily. Pt takes 3 tabs daily (3,000) units    clopidogreL (PLAVIX) 75 mg tablet Take 1 tablet (75 mg total) by mouth once daily.    cyanocobalamin, vitamin B-12, 50 mcg tablet Take 50 mcg by mouth once daily.    diclofenac sodium (VOLTAREN) 1 % Gel Apply 2 g topically 4 (four) times daily. To finger for 7 days    docusate sodium (COLACE) 100 MG capsule Take 1 capsule (100 mg total) by mouth 2 (two) times daily. (Patient taking differently: Take 100 mg by mouth 2 (two) times daily as needed.)    DULoxetine (CYMBALTA) 30 MG capsule TAKE 1 CAPSULE (30 MG TOTAL) BY MOUTH 2 (TWO) TIMES DAILY.    ezetimibe (ZETIA) 10 mg tablet Take 1 tablet (10 mg total) by mouth once daily.    famotidine (PEPCID) 40 MG tablet TAKE 1 TABLET BY MOUTH EVERY DAY    fluticasone furoate-vilanteroL (BREO ELLIPTA) 200-25 mcg/dose DsDv diskus inhaler Inhale 1 puff into the lungs once daily. Controller    furosemide (LASIX) 20 MG tablet Take 1 tablet (20 mg total) by mouth daily as needed (Fluid overload).    hydrocortisone 2.5 % cream Apply to dark patch on leg bid .    irbesartan (AVAPRO) 150 MG tablet Take 1 tablet (150 mg total) by mouth every evening.    isosorbide mononitrate (IMDUR) 30 MG 24 hr tablet Take 1 tablet (30 mg total) by mouth once daily.    ketoconazole (NIZORAL) 2 % cream Have patient mix ketoconazole cream, triamcinolone cream and milk of magnesia in  1:1:1 ratio.  Patient has sterile jar .  Patient to apply mixture to rash bid prn. (Patient taking differently: Have patient mix ketoconazole cream, triamcinolone cream and milk of magnesia in  1:1:1 ratio.  Patient has sterile jar .  Patient to apply mixture to rash bid prn.)    latanoprost  "(XALATAN) 0.005 % ophthalmic solution Place 1 drop into both eyes once daily.    montelukast (SINGULAIR) 10 mg tablet Take 10 mg by mouth nightly.    mv-min-folic-calcium carb-K1 400 mcg-500 mg calcium-20 mcg Tab Take 1 tablet by mouth once daily.    nitroGLYCERIN (NITROSTAT) 0.4 MG SL tablet Place 1 tablet (0.4 mg total) under the tongue every 5 (five) minutes as needed for Chest pain. PLACE 1 TABLET (0.4 MG TOTAL) UNDER THE TONGUE EVERY 5 (FIVE) MINUTES AS NEEDED FOR CHEST PAIN.    pregabalin (LYRICA) 75 MG capsule Take 1 capsule (75 mg total) by mouth 2 (two) times daily.    PROAIR HFA 90 mcg/actuation inhaler INHALE 2 PUFFS INTO THE LUNGS 4 (FOUR) TIMES DAILY AS NEEDED.    solifenacin (VESICARE) 5 MG tablet TAKE 1 TABLET BY MOUTH EVERY DAY    triamcinolone acetonide 0.1% (KENALOG) 0.1 % cream Have patient mix triamcinolone cream with ketoconazole cream and milk of magnesia in a 1:1:1 ratio.  Patient has sterile jar .  Apply mixture to rash bid prn. (Patient taking differently: Have patient mix triamcinolone cream with ketoconazole cream and milk of magnesia in a 1:1:1 ratio.  Patient has sterile jar .  Apply mixture to rash bid prn.)    vitamin E 100 UNIT capsule Take 100 Units by mouth once daily. Pt states she takes "2000" units daily   Last reviewed on 6/24/2022 11:09 AM by Celia Bee RN    Review of patient's allergies indicates:   Allergen Reactions    Aspirin Hives    Clindamycin      rash    Metoprolol tartrate Hives    Ranexa [ranolazine] Other (See Comments)     cough    Strawberry Rash    Tramadol Itching    Last reviewed on  7/7/2022 3:29 PM by Char Diaz      Tasks added this encounter   8/9/2022 - Refill Call (Auto Added)   Tasks due within next 3 months   No tasks due.     Madison Panda [facility]  [address] [place] [id number]  Phone: [phone]  Fax: [phone]      "

## 2022-07-26 PROBLEM — I51.7 RIGHT VENTRICULAR ENLARGEMENT: Status: ACTIVE | Noted: 2022-07-26

## 2022-07-26 PROBLEM — I65.23 BILATERAL CAROTID ARTERY STENOSIS: Status: ACTIVE | Noted: 2022-07-26

## 2022-07-26 PROBLEM — I50.20 HEART FAILURE WITH REDUCED EJECTION FRACTION, NYHA CLASS III: Status: RESOLVED | Noted: 2019-05-29 | Resolved: 2022-07-26

## 2022-07-26 PROBLEM — I51.7 RIGHT ATRIAL ENLARGEMENT: Status: ACTIVE | Noted: 2022-07-26

## 2022-07-26 PROBLEM — M65.342 TRIGGER RING FINGER OF LEFT HAND: Status: ACTIVE | Noted: 2022-07-26

## 2022-07-26 PROBLEM — Z71.89 CPAP USE COUNSELING: Status: ACTIVE | Noted: 2022-07-26

## 2022-07-26 PROBLEM — I77.9 CAROTID ARTERY DISEASE: Status: RESOLVED | Noted: 2020-09-03 | Resolved: 2022-07-26

## 2022-08-09 ENCOUNTER — SPECIALTY PHARMACY (OUTPATIENT)
Dept: PHARMACY | Facility: CLINIC | Age: 67
End: 2022-08-09
Payer: MEDICAID

## 2022-08-09 NOTE — TELEPHONE ENCOUNTER
Specialty Pharmacy - Refill Coordination    Specialty Medication Orders Linked to Encounter    Flowsheet Row Most Recent Value   Medication #1 alirocumab (PRALUENT PEN) 150 mg/mL PnIj (Order#[id number], Rx#[id number])          Refill Questions - Documented Responses    Flowsheet Row Most Recent Value   Patient Availability and HIPAA Verification    Does patient want to proceed with activity? Yes   HIPAA/medical authority confirmed? Yes   Relationship to patient of person spoken to? Self   Refill Screening Questions    Would patient like to speak to a pharmacist? No   When does the patient need to receive the medication? 08/16/22   Refill Delivery Questions    How will the patient receive the medication? Delivery [place]   When does the patient need to receive the medication? 08/16/22   Shipping Address Prescription   Address in [facility] confirmed and updated if neccessary? Yes   Expected Copay ($) 0   Is the patient able to afford the medication copay? Yes   Payment Method zero copay   Days supply of Refill 28   Supplies needed? No supplies needed   Refill activity completed? Yes   Refill activity plan Refill scheduled   Shipment/Pickup Date: 08/11/22          Current Outpatient Medications   Medication Sig    acetaminophen (TYLENOL) 650 MG TbSR Take 650 mg by mouth every 8 (eight) hours.    alendronate (FOSAMAX) 10 MG Tab Take 1 tablet (10 mg total) by mouth once daily.    alirocumab (PRALUENT PEN) 150 mg/mL PnIj Inject 2 mL (300 mg total) into the skin as instructed (once a month).    ammonium lactate (LAC-HYDRIN) 12 % lotion Apply topically 2 (two) times daily. Apply to whole body    ascorbic acid, vitamin C, (VITAMIN C) 1000 MG tablet Take 3,000 mg by mouth once daily.    atorvastatin (LIPITOR) 80 MG tablet Take 1 tablet (80 mg total) by mouth every evening.    calcium carbonate (OS-KODY) 500 mg calcium (1,250 mg) tablet Take 1 tablet (500 mg total) by mouth once daily.    carvediloL (COREG) 12.5 MG  tablet Take 1 tablet (12.5 mg total) by mouth 2 (two) times daily with meals.    cholecalciferol, vitamin D3, (VITAMIN D3) 25 mcg (1,000 unit) capsule Take 1,000 Units by mouth once daily. Pt takes 3 tabs daily (3,000) units    clopidogreL (PLAVIX) 75 mg tablet Take 1 tablet (75 mg total) by mouth once daily.    cyanocobalamin, vitamin B-12, 50 mcg tablet Take 50 mcg by mouth once daily.    diclofenac sodium (VOLTAREN) 1 % Gel Apply 2 g topically 4 (four) times daily. To finger for 7 days    docusate sodium (COLACE) 100 MG capsule Take 1 capsule (100 mg total) by mouth 2 (two) times daily. (Patient taking differently: Take 100 mg by mouth 2 (two) times daily as needed.)    DULoxetine (CYMBALTA) 30 MG capsule TAKE 1 CAPSULE (30 MG TOTAL) BY MOUTH 2 (TWO) TIMES DAILY.    ezetimibe (ZETIA) 10 mg tablet Take 1 tablet (10 mg total) by mouth once daily.    famotidine (PEPCID) 40 MG tablet TAKE 1 TABLET BY MOUTH EVERY DAY    fluticasone furoate-vilanteroL (BREO ELLIPTA) 200-25 mcg/dose DsDv diskus inhaler Inhale 1 puff into the lungs once daily. Controller    furosemide (LASIX) 20 MG tablet Take 1 tablet (20 mg total) by mouth daily as needed (Fluid overload).    hydrocortisone 2.5 % cream Apply to dark patch on leg bid .    irbesartan (AVAPRO) 150 MG tablet Take 1 tablet (150 mg total) by mouth every evening.    isosorbide mononitrate (IMDUR) 30 MG 24 hr tablet Take 1 tablet (30 mg total) by mouth once daily.    ketoconazole (NIZORAL) 2 % cream Have patient mix ketoconazole cream, triamcinolone cream and milk of magnesia in  1:1:1 ratio.  Patient has sterile jar .  Patient to apply mixture to rash bid prn. (Patient taking differently: Have patient mix ketoconazole cream, triamcinolone cream and milk of magnesia in  1:1:1 ratio.  Patient has sterile jar .  Patient to apply mixture to rash bid prn.)    latanoprost (XALATAN) 0.005 % ophthalmic solution Place 1 drop into both eyes once daily.    montelukast  "(SINGULAIR) 10 mg tablet Take 10 mg by mouth nightly.    mv-min-folic-calcium carb-K1 400 mcg-500 mg calcium-20 mcg Tab Take 1 tablet by mouth once daily.    nitroGLYCERIN (NITROSTAT) 0.4 MG SL tablet Place 1 tablet (0.4 mg total) under the tongue every 5 (five) minutes as needed for Chest pain. PLACE 1 TABLET (0.4 MG TOTAL) UNDER THE TONGUE EVERY 5 (FIVE) MINUTES AS NEEDED FOR CHEST PAIN.    pregabalin (LYRICA) 75 MG capsule Take 1 capsule (75 mg total) by mouth 2 (two) times daily.    PROAIR HFA 90 mcg/actuation inhaler INHALE 2 PUFFS INTO THE LUNGS 4 (FOUR) TIMES DAILY AS NEEDED.    solifenacin (VESICARE) 5 MG tablet Take 1 tablet (5 mg total) by mouth once daily.    triamcinolone acetonide 0.1% (KENALOG) 0.1 % cream Have patient mix triamcinolone cream with ketoconazole cream and milk of magnesia in a 1:1:1 ratio.  Patient has sterile jar .  Apply mixture to rash bid prn. (Patient taking differently: Have patient mix triamcinolone cream with ketoconazole cream and milk of magnesia in a 1:1:1 ratio.  Patient has sterile jar .  Apply mixture to rash bid prn.)    vitamin E 100 UNIT capsule Take 100 Units by mouth once daily. Pt states she takes "2000" units daily   Last reviewed on 7/26/2022  2:55 PM by Colleen Jaramillo MA    Review of patient's allergies indicates:   Allergen Reactions    Aspirin Hives    Clindamycin      rash    Metoprolol tartrate Hives    Ranexa [ranolazine] Other (See Comments)     cough    Strawberry Rash    Tramadol Itching    Last reviewed on  7/29/2022 12:57 PM by Emma Fox      Tasks added this encounter   9/6/2022 - Refill Call (Auto Added)   Tasks due within next 3 months   No tasks due.     [facility]  [address] [place] [id number]  Phone: [phone]  Fax: [phone]      "

## 2022-09-06 ENCOUNTER — SPECIALTY PHARMACY (OUTPATIENT)
Dept: PHARMACY | Facility: CLINIC | Age: 67
End: 2022-09-06
Payer: MEDICAID

## 2022-09-06 NOTE — TELEPHONE ENCOUNTER
Specialty Pharmacy - Refill Coordination    Specialty Medication Orders Linked to Encounter      Flowsheet Row Most Recent Value   Medication #1 alirocumab (PRALUENT PEN) 150 mg/mL PnIj (Order#[id number], Rx#[id number])            Refill Questions - Documented Responses      Flowsheet Row Most Recent Value   Patient Availability and HIPAA Verification    Does patient want to proceed with activity? Yes   HIPAA/medical authority confirmed? Yes   Relationship to patient of person spoken to? Self   Refill Screening Questions    Would patient like to speak to a pharmacist? No   When does the patient need to receive the medication? 09/10/22   Refill Delivery Questions    How will the patient receive the medication? Delivery [place]   When does the patient need to receive the medication? 09/10/22   Shipping Address Prescription   Address in [facility] confirmed and updated if neccessary? Yes   Expected Copay ($) 0   Is the patient able to afford the medication copay? Yes   Payment Method zero copay   Days supply of Refill 28   Supplies needed? No supplies needed   Refill activity completed? Yes   Refill activity plan Refill scheduled   Shipment/Pickup Date: 09/07/22            Current Outpatient Medications   Medication Sig    acetaminophen (TYLENOL) 650 MG TbSR Take 650 mg by mouth every 8 (eight) hours.    alendronate (FOSAMAX) 10 MG Tab Take 1 tablet (10 mg total) by mouth once daily. (Patient not taking: Reported on 8/29/2022)    alirocumab (PRALUENT PEN) 150 mg/mL PnIj Inject 2 mL (300 mg total) into the skin as instructed (once a month).    ammonium lactate (LAC-HYDRIN) 12 % lotion Apply topically 3 (three) times daily. Apply to whole body    ascorbic acid, vitamin C, (VITAMIN C) 1000 MG tablet Take 3,000 mg by mouth once daily.    atorvastatin (LIPITOR) 80 MG tablet Take 1 tablet (80 mg total) by mouth every evening.    calcium carbonate (OS-KODY) 500 mg calcium (1,250 mg) tablet Take 1 tablet (500 mg total) by  mouth once daily.    carvediloL (COREG) 12.5 MG tablet Take 1 tablet (12.5 mg total) by mouth 2 (two) times daily with meals.    cholecalciferol, vitamin D3, (VITAMIN D3) 25 mcg (1,000 unit) capsule Take 1,000 Units by mouth once daily. Pt takes 3 tabs daily (3,000) units    clopidogreL (PLAVIX) 75 mg tablet Take 1 tablet (75 mg total) by mouth once daily.    cyanocobalamin, vitamin B-12, 50 mcg tablet Take 50 mcg by mouth once daily.    diclofenac sodium (VOLTAREN) 1 % Gel Apply 2 g topically 4 (four) times daily. To finger for 7 days (Patient not taking: Reported on 8/29/2022)    docusate sodium (COLACE) 100 MG capsule Take 1 capsule (100 mg total) by mouth 2 (two) times daily. (Patient taking differently: Take 100 mg by mouth 2 (two) times daily as needed.)    DULoxetine (CYMBALTA) 30 MG capsule TAKE 1 CAPSULE (30 MG TOTAL) BY MOUTH 2 (TWO) TIMES DAILY. (Patient not taking: No sig reported)    ezetimibe (ZETIA) 10 mg tablet Take 1 tablet (10 mg total) by mouth once daily.    famotidine (PEPCID) 40 MG tablet TAKE 1 TABLET BY MOUTH EVERY DAY    fluticasone furoate-vilanteroL (BREO ELLIPTA) 200-25 mcg/dose DsDv diskus inhaler Inhale 1 puff into the lungs once daily. Controller    furosemide (LASIX) 20 MG tablet Take 1 tablet (20 mg total) by mouth daily as needed (Fluid overload).    hydrocortisone 2.5 % cream Apply to dark patch on leg bid .    irbesartan (AVAPRO) 150 MG tablet Take 1 tablet (150 mg total) by mouth every evening.    isosorbide mononitrate (IMDUR) 30 MG 24 hr tablet Take 1 tablet (30 mg total) by mouth once daily.    ketoconazole (NIZORAL) 2 % cream Have patient mix ketoconazole cream, triamcinolone cream and milk of magnesia in  1:1:1 ratio.  Patient has sterile jar .  Patient to apply mixture to rash bid prn. (Patient taking differently: Have patient mix ketoconazole cream, triamcinolone cream and milk of magnesia in  1:1:1 ratio.  Patient has sterile jar .  Patient to apply mixture to rash bid  "prn.)    latanoprost (XALATAN) 0.005 % ophthalmic solution Place 1 drop into both eyes once daily.    montelukast (SINGULAIR) 10 mg tablet Take 1 tablet (10 mg total) by mouth nightly.    mv-min-folic-calcium carb-K1 400 mcg-500 mg calcium-20 mcg Tab Take 1 tablet by mouth once daily.    nitroGLYCERIN (NITROSTAT) 0.4 MG SL tablet Place 1 tablet (0.4 mg total) under the tongue every 5 (five) minutes as needed for Chest pain. PLACE 1 TABLET (0.4 MG TOTAL) UNDER THE TONGUE EVERY 5 (FIVE) MINUTES AS NEEDED FOR CHEST PAIN.    pregabalin (LYRICA) 75 MG capsule Take 1 capsule (75 mg total) by mouth 2 (two) times daily. (Patient not taking: Reported on 8/29/2022)    PROAIR HFA 90 mcg/actuation inhaler INHALE 2 PUFFS INTO THE LUNGS 4 (FOUR) TIMES DAILY AS NEEDED.    solifenacin (VESICARE) 5 MG tablet Take 1 tablet (5 mg total) by mouth once daily.    triamcinolone acetonide 0.1% (KENALOG) 0.1 % cream Have patient mix triamcinolone cream with ketoconazole cream and milk of magnesia in a 1:1:1 ratio.  Patient has sterile jar .  Apply mixture to rash bid prn. (Patient taking differently: Have patient mix triamcinolone cream with ketoconazole cream and milk of magnesia in a 1:1:1 ratio.  Patient has sterile jar .  Apply mixture to rash bid prn.)    vitamin E 100 UNIT capsule Take 100 Units by mouth once daily. Pt states she takes "2000" units daily   Last reviewed on 8/29/2022  3:04 PM by Sona Orta MA    Review of patient's allergies indicates:   Allergen Reactions    Aspirin Hives    Clindamycin      rash    Metoprolol tartrate Hives    Ranexa [ranolazine] Other (See Comments)     cough    Strawberry Rash    Tramadol Itching    Last reviewed on  8/29/2022 2:55 PM by Sona Orta      Tasks added this encounter   10/1/2022 - Refill Call (Auto Added)   Tasks due within next 3 months   No tasks due.     Lisa Larson, PharmD  [facility]  [address] [facility] [address]  Phone: " [phone]  Fax: [phone]

## 2022-10-06 ENCOUNTER — SPECIALTY PHARMACY (OUTPATIENT)
Dept: PHARMACY | Facility: CLINIC | Age: 67
End: 2022-10-06
Payer: MEDICAID

## 2022-10-06 NOTE — TELEPHONE ENCOUNTER
Specialty Pharmacy - Refill Coordination    Specialty Medication Orders Linked to Encounter      Flowsheet Row Most Recent Value   Medication #1 alirocumab (PRALUENT PEN) 150 mg/mL PnIj (Order#[id number], Rx#[id number])            Refill Questions - Documented Responses      Flowsheet Row Most Recent Value   Patient Availability and HIPAA Verification    Does patient want to proceed with activity? Yes   HIPAA/medical authority confirmed? Yes   Relationship to patient of person spoken to? Self   Refill Screening Questions    Would patient like to speak to a pharmacist? No   When does the patient need to receive the medication? 10/09/22   Refill Delivery Questions    How will the patient receive the medication? [facility]   When does the patient need to receive the medication? 10/09/22   Shipping Address Prescription   Address in [facility] confirmed and updated if neccessary? Yes   Expected Copay ($) 0   Is the patient able to afford the medication copay? Yes   Payment Method zero copay   Days supply of Refill 28   Supplies needed? No supplies needed   Refill activity completed? Yes   Refill activity plan Refill scheduled   Shipment/Pickup Date: 10/06/22            Current Outpatient Medications   Medication Sig    acetaminophen (TYLENOL) 650 MG TbSR Take 650 mg by mouth every 8 (eight) hours.    alendronate (FOSAMAX) 10 MG Tab Take 1 tablet (10 mg total) by mouth once daily. (Patient not taking: Reported on 8/29/2022)    alirocumab (PRALUENT PEN) 150 mg/mL PnIj Inject 2 mL (300 mg total) into the skin as instructed (once a month).    ammonium lactate (LAC-HYDRIN) 12 % lotion Apply topically 3 (three) times daily. Apply to whole body    ascorbic acid, vitamin C, (VITAMIN C) 1000 MG tablet Take 3,000 mg by mouth once daily.    atorvastatin (LIPITOR) 80 MG tablet Take 1 tablet (80 mg total) by mouth every evening.    calcium carbonate (OS-KODY) 500 mg calcium (1,250 mg) tablet Take 1 tablet (500 mg total) by mouth  once daily.    carvediloL (COREG) 12.5 MG tablet Take 1 tablet (12.5 mg total) by mouth 2 (two) times daily with meals.    cholecalciferol, vitamin D3, (VITAMIN D3) 25 mcg (1,000 unit) capsule Take 1,000 Units by mouth once daily. Pt takes 3 tabs daily (3,000) units    clopidogreL (PLAVIX) 75 mg tablet Take 1 tablet (75 mg total) by mouth once daily.    cyanocobalamin, vitamin B-12, 50 mcg tablet Take 50 mcg by mouth once daily.    diclofenac sodium (VOLTAREN) 1 % Gel Apply 2 g topically 4 (four) times daily. To finger for 7 days (Patient not taking: Reported on 8/29/2022)    docusate sodium (COLACE) 100 MG capsule Take 1 capsule (100 mg total) by mouth 2 (two) times daily. (Patient taking differently: Take 100 mg by mouth 2 (two) times daily as needed.)    DULoxetine (CYMBALTA) 30 MG capsule TAKE 1 CAPSULE (30 MG TOTAL) BY MOUTH 2 (TWO) TIMES DAILY. (Patient not taking: No sig reported)    ezetimibe (ZETIA) 10 mg tablet Take 1 tablet (10 mg total) by mouth once daily.    famotidine (PEPCID) 40 MG tablet TAKE 1 TABLET BY MOUTH EVERY DAY    fluticasone furoate-vilanteroL (BREO ELLIPTA) 200-25 mcg/dose DsDv diskus inhaler Inhale 1 puff into the lungs once daily. Controller    furosemide (LASIX) 20 MG tablet Take 1 tablet (20 mg total) by mouth daily as needed (Fluid overload).    hydrocortisone 2.5 % cream Apply to dark patch on leg bid .    irbesartan (AVAPRO) 150 MG tablet Take 1 tablet (150 mg total) by mouth every evening.    isosorbide mononitrate (IMDUR) 30 MG 24 hr tablet Take 1 tablet (30 mg total) by mouth once daily.    ketoconazole (NIZORAL) 2 % cream Have patient mix ketoconazole cream, triamcinolone cream and milk of magnesia in  1:1:1 ratio.  Patient has sterile jar .  Patient to apply mixture to rash bid prn. (Patient taking differently: Have patient mix ketoconazole cream, triamcinolone cream and milk of magnesia in  1:1:1 ratio.  Patient has sterile jar .  Patient to apply mixture to rash bid prn.)     "latanoprost (XALATAN) 0.005 % ophthalmic solution Place 1 drop into both eyes once daily.    montelukast (SINGULAIR) 10 mg tablet Take 1 tablet (10 mg total) by mouth nightly.    mv-min-folic-calcium carb-K1 400 mcg-500 mg calcium-20 mcg Tab Take 1 tablet by mouth once daily.    nitroGLYCERIN (NITROSTAT) 0.4 MG SL tablet Place 1 tablet (0.4 mg total) under the tongue every 5 (five) minutes as needed for Chest pain. PLACE 1 TABLET (0.4 MG TOTAL) UNDER THE TONGUE EVERY 5 (FIVE) MINUTES AS NEEDED FOR CHEST PAIN.    pregabalin (LYRICA) 75 MG capsule Take 1 capsule (75 mg total) by mouth 2 (two) times daily. (Patient not taking: Reported on 8/29/2022)    PROAIR HFA 90 mcg/actuation inhaler INHALE 2 PUFFS INTO THE LUNGS 4 (FOUR) TIMES DAILY AS NEEDED.    solifenacin (VESICARE) 5 MG tablet Take 1 tablet (5 mg total) by mouth once daily.    triamcinolone acetonide 0.1% (KENALOG) 0.1 % cream Have patient mix triamcinolone cream with ketoconazole cream and milk of magnesia in a 1:1:1 ratio.  Patient has sterile jar .  Apply mixture to rash bid prn. (Patient taking differently: Have patient mix triamcinolone cream with ketoconazole cream and milk of magnesia in a 1:1:1 ratio.  Patient has sterile jar .  Apply mixture to rash bid prn.)    vitamin E 100 UNIT capsule Take 100 Units by mouth once daily. Pt states she takes "2000" units daily   Last reviewed on 8/29/2022  3:04 PM by Sona Orta MA    Review of patient's allergies indicates:   Allergen Reactions    Aspirin Hives    Clindamycin      rash    Metoprolol tartrate Hives    Ranexa [ranolazine] Other (See Comments)     cough    Strawberry Rash    Tramadol Itching    Last reviewed on  8/29/2022 2:55 PM by Sona Orta      Tasks added this encounter   10/30/2022 - Refill Call (Auto Added)   Tasks due within next 3 months   No tasks due.     [facility]  [address] [place] [id number]  Phone: [phone]  Fax: " [phone]

## 2022-11-03 ENCOUNTER — SPECIALTY PHARMACY (OUTPATIENT)
Dept: PHARMACY | Facility: CLINIC | Age: 67
End: 2022-11-03
Payer: MEDICAID

## 2022-11-03 NOTE — TELEPHONE ENCOUNTER
Specialty Pharmacy - Refill Coordination    Specialty Medication Orders Linked to Encounter      Flowsheet Row Most Recent Value   Medication #1 alirocumab (PRALUENT PEN) 150 mg/mL PnIj (Order#[id number], Rx#[id number])            Refill Questions - Documented Responses      Flowsheet Row Most Recent Value   Patient Availability and HIPAA Verification    Does patient want to proceed with activity? Yes   HIPAA/medical authority confirmed? Yes   Relationship to patient of person spoken to? Self   Refill Screening Questions    Would patient like to speak to a pharmacist? No   When does the patient need to receive the medication? 11/07/22   Refill Delivery Questions    How will the patient receive the medication? [facility]   When does the patient need to receive the medication? 11/07/22   Shipping Address Prescription   Address in [facility] confirmed and updated if neccessary? Yes   Expected Copay ($) 0   Is the patient able to afford the medication copay? Yes   Payment Method zero copay   Days supply of Refill 28   Supplies needed? Alcohol Swabs   Refill activity completed? Yes   Refill activity plan Refill scheduled   Shipment/Pickup Date: 11/04/22            Current Outpatient Medications   Medication Sig    acetaminophen (TYLENOL) 650 MG TbSR Take 650 mg by mouth every 8 (eight) hours.    alendronate (FOSAMAX) 10 MG Tab Take 1 tablet (10 mg total) by mouth once daily. (Patient not taking: Reported on 8/29/2022)    alirocumab (PRALUENT PEN) 150 mg/mL PnIj Inject 2 mL (300 mg total) into the skin as instructed (once a month).    ammonium lactate (LAC-HYDRIN) 12 % lotion Apply topically 3 (three) times daily. Apply to whole body    ascorbic acid, vitamin C, (VITAMIN C) 1000 MG tablet Take 3,000 mg by mouth once daily.    atorvastatin (LIPITOR) 80 MG tablet Take 1 tablet (80 mg total) by mouth every evening.    calcium carbonate (OS-KODY) 500 mg calcium (1,250 mg) tablet Take 1 tablet (500 mg total) by mouth once  daily.    carvediloL (COREG) 12.5 MG tablet Take 1 tablet (12.5 mg total) by mouth 2 (two) times daily with meals.    cholecalciferol, vitamin D3, (VITAMIN D3) 25 mcg (1,000 unit) capsule Take 1,000 Units by mouth once daily. Pt takes 3 tabs daily (3,000) units    clopidogreL (PLAVIX) 75 mg tablet Take 1 tablet (75 mg total) by mouth once daily.    cyanocobalamin, vitamin B-12, 50 mcg tablet Take 50 mcg by mouth once daily.    diclofenac sodium (VOLTAREN) 1 % Gel Apply 2 g topically 4 (four) times daily. To finger for 7 days (Patient not taking: Reported on 8/29/2022)    docusate sodium (COLACE) 100 MG capsule Take 1 capsule (100 mg total) by mouth 2 (two) times daily. (Patient taking differently: Take 100 mg by mouth 2 (two) times daily as needed.)    DULoxetine (CYMBALTA) 30 MG capsule TAKE 1 CAPSULE (30 MG TOTAL) BY MOUTH 2 (TWO) TIMES DAILY. (Patient not taking: No sig reported)    ezetimibe (ZETIA) 10 mg tablet Take 1 tablet (10 mg total) by mouth once daily.    famotidine (PEPCID) 40 MG tablet TAKE 1 TABLET BY MOUTH EVERY DAY    fluticasone furoate-vilanteroL (BREO ELLIPTA) 200-25 mcg/dose DsDv diskus inhaler Inhale 1 puff into the lungs once daily. Controller    furosemide (LASIX) 20 MG tablet Take 1 tablet (20 mg total) by mouth daily as needed (Fluid overload).    hydrocortisone 2.5 % cream Apply to dark patch on leg bid .    irbesartan (AVAPRO) 150 MG tablet Take 1 tablet (150 mg total) by mouth every evening.    isosorbide mononitrate (IMDUR) 30 MG 24 hr tablet Take 1 tablet (30 mg total) by mouth once daily.    ketoconazole (NIZORAL) 2 % cream Have patient mix ketoconazole cream, triamcinolone cream and milk of magnesia in  1:1:1 ratio.  Patient has sterile jar .  Patient to apply mixture to rash bid prn. (Patient taking differently: Have patient mix ketoconazole cream, triamcinolone cream and milk of magnesia in  1:1:1 ratio.  Patient has sterile jar .  Patient to apply mixture to rash bid prn.)     "latanoprost (XALATAN) 0.005 % ophthalmic solution Place 1 drop into both eyes once daily.    montelukast (SINGULAIR) 10 mg tablet Take 1 tablet (10 mg total) by mouth nightly.    mv-min-folic-calcium carb-K1 400 mcg-500 mg calcium-20 mcg Tab Take 1 tablet by mouth once daily.    nitroGLYCERIN (NITROSTAT) 0.4 MG SL tablet Place 1 tablet (0.4 mg total) under the tongue every 5 (five) minutes as needed for Chest pain. PLACE 1 TABLET (0.4 MG TOTAL) UNDER THE TONGUE EVERY 5 (FIVE) MINUTES AS NEEDED FOR CHEST PAIN.    pregabalin (LYRICA) 75 MG capsule Take 1 capsule (75 mg total) by mouth 2 (two) times daily. (Patient not taking: Reported on 8/29/2022)    PROAIR HFA 90 mcg/actuation inhaler INHALE 2 PUFFS INTO THE LUNGS 4 (FOUR) TIMES DAILY AS NEEDED.    solifenacin (VESICARE) 5 MG tablet Take 1 tablet (5 mg total) by mouth once daily.    triamcinolone acetonide 0.1% (KENALOG) 0.1 % cream Have patient mix triamcinolone cream with ketoconazole cream and milk of magnesia in a 1:1:1 ratio.  Patient has sterile jar .  Apply mixture to rash bid prn. (Patient taking differently: Have patient mix triamcinolone cream with ketoconazole cream and milk of magnesia in a 1:1:1 ratio.  Patient has sterile jar .  Apply mixture to rash bid prn.)    vitamin E 100 UNIT capsule Take 100 Units by mouth once daily. Pt states she takes "2000" units daily   Last reviewed on 8/29/2022  3:04 PM by Sona Orta MA    Review of patient's allergies indicates:   Allergen Reactions    Aspirin Hives    Clindamycin      rash    Metoprolol tartrate Hives    Ranexa [ranolazine] Other (See Comments)     cough    Strawberry Rash    Tramadol Itching    Last reviewed on  8/29/2022 2:55 PM by Sona Orta      Tasks added this encounter   11/28/2022 - Refill Call (Auto Added)   Tasks due within next 3 months   No tasks due.     Solomon Ramos, PharmD  [facility]  [address] [facility] [address]  Phone: " [phone]  Fax: [phone]

## 2022-12-07 ENCOUNTER — SPECIALTY PHARMACY (OUTPATIENT)
Dept: PHARMACY | Facility: CLINIC | Age: 67
End: 2022-12-07
Payer: MEDICAID

## 2022-12-12 NOTE — TELEPHONE ENCOUNTER
Specialty Pharmacy - Refill Coordination    Specialty Medication Orders Linked to Encounter      Flowsheet Row Most Recent Value   Medication #1 alirocumab (PRALUENT PEN) 150 mg/mL PnIj (Order#[id number], Rx#[id number])          Refill Questions - Documented Responses      Flowsheet Row Most Recent Value   Patient Availability and HIPAA Verification    Does patient want to proceed with activity? Unable to Reach          We have had multiple attempts to the patient and have been unsuccessful to reach the patient. We will stop reaching out to the patient but in the event that the patient needs the med and contacts us, we will communicate and begin dispensing for the patient. At your next visit with the patient, please review the importance of being in contact with our specialty pharmacy as a part of our care team.      Lisa Larson, PharmD  [facility]  [address] [facility] [address]  Phone: [phone]  Fax: [phone]

## 2023-01-03 ENCOUNTER — SPECIALTY PHARMACY (OUTPATIENT)
Dept: PHARMACY | Facility: CLINIC | Age: 68
End: 2023-01-03
Payer: MEDICARE

## 2023-01-03 NOTE — TELEPHONE ENCOUNTER
Specialty Pharmacy - Refill Coordination    Specialty Medication Orders Linked to Encounter      Flowsheet Row Most Recent Value   Medication #1 alirocumab (PRALUENT PEN) 150 mg/mL PnIj (Order#[id number], Rx#[id number])            Refill Questions - Documented Responses      Flowsheet Row Most Recent Value   Patient Availability and HIPAA Verification    Does patient want to proceed with activity? Yes   HIPAA/medical authority confirmed? Yes   Relationship to patient of person spoken to? Self   Refill Screening Questions    Would patient like to speak to a pharmacist? No   When does the patient need to receive the medication? 01/05/23   Refill Delivery Questions    How will the patient receive the medication? MEDRx   When does the patient need to receive the medication? 01/05/23   Shipping Address Prescription   Address in [facility] confirmed and updated if neccessary? Yes   Expected Copay ($) 0   Is the patient able to afford the medication copay? Yes   Payment Method zero copay   Days supply of Refill 28   Supplies needed? No supplies needed   Refill activity completed? Yes   Refill activity plan Refill scheduled   Shipment/Pickup Date: 01/04/23            Current Outpatient Medications   Medication Sig    acetaminophen (TYLENOL) 650 MG TbSR Take 650 mg by mouth every 8 (eight) hours.    alendronate (FOSAMAX) 10 MG Tab Take 1 tablet (10 mg total) by mouth once daily. (Patient not taking: Reported on 8/29/2022)    alirocumab (PRALUENT PEN) 150 mg/mL PnIj Inject 2 mL (300 mg total) into the skin as instructed (once a month).    ammonium lactate (LAC-HYDRIN) 12 % lotion Apply topically 3 (three) times daily. Apply to whole body    ascorbic acid, vitamin C, (VITAMIN C) 1000 MG tablet Take 3,000 mg by mouth once daily.    atorvastatin (LIPITOR) 80 MG tablet Take 1 tablet (80 mg total) by mouth every evening.    calcium carbonate (OS-KODY) 500 mg calcium (1,250 mg) tablet Take 1 tablet (500 mg total) by mouth  once daily.    carvediloL (COREG) 12.5 MG tablet Take 1 tablet (12.5 mg total) by mouth 2 (two) times daily with meals.    cholecalciferol, vitamin D3, (VITAMIN D3) 25 mcg (1,000 unit) capsule Take 1,000 Units by mouth once daily. Pt takes 3 tabs daily (3,000) units    clopidogreL (PLAVIX) 75 mg tablet Take 1 tablet (75 mg total) by mouth once daily.    cyanocobalamin, vitamin B-12, 50 mcg tablet Take 50 mcg by mouth once daily.    diclofenac sodium (VOLTAREN) 1 % Gel Apply 2 g topically 4 (four) times daily. To finger for 7 days (Patient not taking: Reported on 8/29/2022)    docusate sodium (COLACE) 100 MG capsule Take 1 capsule (100 mg total) by mouth 2 (two) times daily. (Patient taking differently: Take 100 mg by mouth 2 (two) times daily as needed.)    DULoxetine (CYMBALTA) 30 MG capsule TAKE 1 CAPSULE (30 MG TOTAL) BY MOUTH 2 (TWO) TIMES DAILY. (Patient not taking: No sig reported)    ezetimibe (ZETIA) 10 mg tablet Take 1 tablet (10 mg total) by mouth once daily.    famotidine (PEPCID) 40 MG tablet TAKE 1 TABLET BY MOUTH EVERY DAY    fluticasone furoate-vilanteroL (BREO ELLIPTA) 200-25 mcg/dose DsDv diskus inhaler Inhale 1 puff into the lungs once daily. Controller    furosemide (LASIX) 20 MG tablet Take 1 tablet (20 mg total) by mouth daily as needed (Fluid overload).    hydrocortisone 2.5 % cream Apply to dark patch on leg bid .    irbesartan (AVAPRO) 150 MG tablet Take 1 tablet (150 mg total) by mouth every evening.    isosorbide mononitrate (IMDUR) 30 MG 24 hr tablet Take 1 tablet (30 mg total) by mouth once daily.    ketoconazole (NIZORAL) 2 % cream Have patient mix ketoconazole cream, triamcinolone cream and milk of magnesia in  1:1:1 ratio.  Patient has sterile jar .  Patient to apply mixture to rash bid prn. (Patient taking differently: Have patient mix ketoconazole cream, triamcinolone cream and milk of magnesia in  1:1:1 ratio.  Patient has sterile jar .  Patient to apply mixture to rash bid prn.)     "latanoprost (XALATAN) 0.005 % ophthalmic solution Place 1 drop into both eyes once daily.    montelukast (SINGULAIR) 10 mg tablet Take 1 tablet (10 mg total) by mouth nightly.    mv-min-folic-calcium carb-K1 400 mcg-500 mg calcium-20 mcg Tab Take 1 tablet by mouth once daily.    nitroGLYCERIN (NITROSTAT) 0.4 MG SL tablet Place 1 tablet (0.4 mg total) under the tongue every 5 (five) minutes as needed for Chest pain. PLACE 1 TABLET (0.4 MG TOTAL) UNDER THE TONGUE EVERY 5 (FIVE) MINUTES AS NEEDED FOR CHEST PAIN.    pregabalin (LYRICA) 75 MG capsule Take 1 capsule (75 mg total) by mouth 2 (two) times daily. (Patient not taking: Reported on 8/29/2022)    PROAIR HFA 90 mcg/actuation inhaler INHALE 2 PUFFS INTO THE LUNGS 4 (FOUR) TIMES DAILY AS NEEDED.    solifenacin (VESICARE) 5 MG tablet Take 1 tablet (5 mg total) by mouth once daily.    triamcinolone acetonide 0.1% (KENALOG) 0.1 % cream Have patient mix triamcinolone cream with ketoconazole cream and milk of magnesia in a 1:1:1 ratio.  Patient has sterile jar .  Apply mixture to rash bid prn. (Patient taking differently: Have patient mix triamcinolone cream with ketoconazole cream and milk of magnesia in a 1:1:1 ratio.  Patient has sterile jar .  Apply mixture to rash bid prn.)    vitamin E 100 UNIT capsule Take 100 Units by mouth once daily. Pt states she takes "2000" units daily   Last reviewed on 8/29/2022  3:04 PM by Sona Orta MA    Review of patient's allergies indicates:   Allergen Reactions    Aspirin Hives    Clindamycin      rash    Metoprolol tartrate Hives    Ranexa [ranolazine] Other (See Comments)     cough    Strawberry Rash    Tramadol Itching    Last reviewed on  8/29/2022 2:55 PM by Sona Orta    Interventions added this encounter   Open: [facility] Patient Intervention: alirocumab (PRALUENT PEN) 150 mg/mL PnIj     Tasks added this encounter   1/26/2023 - Refill Call (Auto Added)   Tasks due within next 3 months   No tasks due.     Dionna Venegas, " PharmD  [facility]  [address]  [facility] [address]  Phone: [phone]  Fax: [phone]

## 2023-01-30 ENCOUNTER — SPECIALTY PHARMACY (OUTPATIENT)
Dept: PHARMACY | Facility: CLINIC | Age: 68
End: 2023-01-30
Payer: MEDICARE

## 2023-01-30 PROBLEM — I73.00 RAYNAUD'S DISEASE WITHOUT GANGRENE: Status: ACTIVE | Noted: 2023-01-30

## 2023-01-30 PROBLEM — I50.20 HEART FAILURE WITH REDUCED EJECTION FRACTION, NYHA CLASS II: Status: ACTIVE | Noted: 2023-01-30

## 2023-01-30 NOTE — TELEPHONE ENCOUNTER
Specialty Pharmacy - Refill Coordination    Specialty Medication Orders Linked to Encounter      Flowsheet Row Most Recent Value   Medication #1 alirocumab (PRALUENT PEN) 150 mg/mL PnIj (Order#[id number], Rx#[id number])            Refill Questions - Documented Responses      Flowsheet Row Most Recent Value   Patient Availability and HIPAA Verification    Does patient want to proceed with activity? Yes   HIPAA/medical authority confirmed? Yes   Relationship to patient of person spoken to? Self   Refill Screening Questions    Would patient like to speak to a pharmacist? No   When does the patient need to receive the medication? 02/10/23   Refill Delivery Questions    How will the patient receive the medication? MEDRx   When does the patient need to receive the medication? 02/10/23   Shipping Address Prescription   Address in [facility] confirmed and updated if neccessary? Yes   Expected Copay ($) 0   Is the patient able to afford the medication copay? Yes   Payment Method zero copay   Days supply of Refill 28   Supplies needed? No supplies needed   Refill activity completed? Yes   Refill activity plan Refill scheduled   Shipment/Pickup Date: 02/09/23            Current Outpatient Medications   Medication Sig    acetaminophen (TYLENOL) 650 MG TbSR Take 650 mg by mouth every 8 (eight) hours.    alendronate (FOSAMAX) 10 MG Tab Take 1 tablet (10 mg total) by mouth once daily.    alirocumab (PRALUENT PEN) 150 mg/mL PnIj Inject 2 mL (300 mg total) into the skin as instructed (once a month).    ammonium lactate (LAC-HYDRIN) 12 % lotion Apply topically 3 (three) times daily. Apply to whole body    ascorbic acid, vitamin C, (VITAMIN C) 1000 MG tablet Take 3,000 mg by mouth once daily.    atorvastatin (LIPITOR) 80 MG tablet Take 1 tablet (80 mg total) by mouth every evening.    calcium carbonate (OS-KODY) 500 mg calcium (1,250 mg) tablet Take 1 tablet (500 mg total) by mouth once daily.    carvediloL (COREG) 12.5 MG  tablet TAKE 1 TABLET BY MOUTH TWICE A DAY WITH MEALS    cholecalciferol, vitamin D3, (VITAMIN D3) 25 mcg (1,000 unit) capsule Take 1,000 Units by mouth once daily. Pt takes 3 tabs daily (3,000) units    clopidogreL (PLAVIX) 75 mg tablet Take 1 tablet (75 mg total) by mouth once daily.    cyanocobalamin, vitamin B-12, 50 mcg tablet Take 50 mcg by mouth once daily.    diclofenac sodium (VOLTAREN) 1 % Gel Apply 2 g topically 4 (four) times daily. To finger for 7 days    docusate sodium (COLACE) 100 MG capsule Take 1 capsule (100 mg total) by mouth 2 (two) times daily. (Patient taking differently: Take 100 mg by mouth 2 (two) times daily as needed.)    DULoxetine (CYMBALTA) 30 MG capsule TAKE 1 CAPSULE (30 MG TOTAL) BY MOUTH 2 (TWO) TIMES DAILY.    ezetimibe (ZETIA) 10 mg tablet Take 1 tablet (10 mg total) by mouth once daily.    famotidine (PEPCID) 40 MG tablet Take 1 tablet (40 mg total) by mouth once daily.    fluticasone furoate-vilanteroL (BREO ELLIPTA) 200-25 mcg/dose DsDv diskus inhaler Inhale 1 puff into the lungs once daily. Controller    furosemide (LASIX) 20 MG tablet Take 1 tablet (20 mg total) by mouth daily as needed (Fluid overload).    hydrocortisone 2.5 % cream Apply to dark patch on leg bid .    irbesartan (AVAPRO) 150 MG tablet Take 1 tablet (150 mg total) by mouth every evening.    isosorbide mononitrate (IMDUR) 30 MG 24 hr tablet TAKE 1 TABLET BY MOUTH EVERY DAY    ketoconazole (NIZORAL) 2 % cream Have patient mix ketoconazole cream, triamcinolone cream and milk of magnesia in  1:1:1 ratio.  Patient has sterile jar .  Patient to apply mixture to rash bid prn. (Patient taking differently: Have patient mix ketoconazole cream, triamcinolone cream and milk of magnesia in  1:1:1 ratio.  Patient has sterile jar .  Patient to apply mixture to rash bid prn.)    latanoprost (XALATAN) 0.005 % ophthalmic solution Place 1 drop into both eyes once daily.    montelukast (SINGULAIR) 10 mg tablet Take 1 tablet (10  "mg total) by mouth nightly.    mv-min-folic-calcium carb-K1 400 mcg-500 mg calcium-20 mcg Tab Take 1 tablet by mouth once daily.    NIFEdipine (PROCARDIA-XL) 30 MG (OSM) 24 hr tablet Take 1 tablet (30 mg total) by mouth once daily. for 10 days    nitroGLYCERIN (NITROSTAT) 0.4 MG SL tablet Place 1 tablet (0.4 mg total) under the tongue every 5 (five) minutes as needed for Chest pain. PLACE 1 TABLET (0.4 MG TOTAL) UNDER THE TONGUE EVERY 5 (FIVE) MINUTES AS NEEDED FOR CHEST PAIN.    pregabalin (LYRICA) 75 MG capsule Take 1 capsule (75 mg total) by mouth 2 (two) times daily.    PROAIR HFA 90 mcg/actuation inhaler INHALE 2 PUFFS INTO THE LUNGS 4 (FOUR) TIMES DAILY AS NEEDED.    solifenacin (VESICARE) 5 MG tablet Take 1 tablet (5 mg total) by mouth once daily.    triamcinolone acetonide 0.1% (KENALOG) 0.1 % cream Have patient mix triamcinolone cream with ketoconazole cream and milk of magnesia in a 1:1:1 ratio.  Patient has sterile jar .  Apply mixture to rash bid prn. (Patient taking differently: Have patient mix triamcinolone cream with ketoconazole cream and milk of magnesia in a 1:1:1 ratio.  Patient has sterile jar .  Apply mixture to rash bid prn.)    vitamin E 100 UNIT capsule Take 100 Units by mouth once daily. Pt states she takes "2000" units daily   Last reviewed on 1/30/2023  9:31 AM by Tena Carroll MA    Review of patient's allergies indicates:   Allergen Reactions    Aspirin Hives    Clindamycin      rash    Metoprolol tartrate Hives    Ranexa [ranolazine] Other (See Comments)     cough    Strawberry Rash    Tramadol Itching    Last reviewed on  1/30/2023 9:25 AM by Tena Carroll      Tasks added this encounter   No tasks added.   Tasks due within next 3 months   1/26/2023 - Refill Call (Auto Added)     Antonia Richmond, [facility]  [address] [place], [place] [address]  [facility] [address]  Phone: [phone]  Fax: [phone]        "

## 2023-03-03 ENCOUNTER — SPECIALTY PHARMACY (OUTPATIENT)
Dept: PHARMACY | Facility: CLINIC | Age: 68
End: 2023-03-03
Payer: MEDICARE

## 2023-03-03 NOTE — TELEPHONE ENCOUNTER
Specialty Pharmacy - Refill Coordination    Specialty Medication Orders Linked to Encounter      Flowsheet Row Most Recent Value   Medication #1 alirocumab (PRALUENT PEN) 150 mg/mL PnIj (Order#[id number], Rx#[id number])            Refill Questions - Documented Responses      Flowsheet Row Most Recent Value   Patient Availability and HIPAA Verification    Does patient want to proceed with activity? Yes   HIPAA/medical authority confirmed? Yes   Relationship to patient of person spoken to? Self   Refill Screening Questions    Would patient like to speak to a pharmacist? No   When does the patient need to receive the medication? 03/06/23   Refill Delivery Questions    How will the patient receive the medication? MEDRx   When does the patient need to receive the medication? 03/06/23   Shipping Address Prescription   Address in [facility] confirmed and updated if neccessary? Yes   Expected Copay ($) 0   Is the patient able to afford the medication copay? Yes   Payment Method zero copay   Days supply of Refill 28   Supplies needed? No supplies needed   Refill activity completed? Yes   Refill activity plan Refill scheduled   Shipment/Pickup Date: 03/03/23            Current Outpatient Medications   Medication Sig    acetaminophen (TYLENOL) 650 MG TbSR Take 650 mg by mouth every 8 (eight) hours.    albuterol (PROAIR HFA) 90 mcg/actuation inhaler INHALE 2 PUFFS INTO THE LUNGS 4 (FOUR) TIMES DAILY AS NEEDED.    alendronate (FOSAMAX) 10 MG Tab Take 1 tablet (10 mg total) by mouth once daily.    alirocumab (PRALUENT PEN) 150 mg/mL PnIj Inject 2 mL (300 mg total) into the skin as instructed (once a month).    ammonium lactate (LAC-HYDRIN) 12 % lotion Apply topically 3 (three) times daily. Apply to whole body    ascorbic acid, vitamin C, (VITAMIN C) 1000 MG tablet Take 3,000 mg by mouth once daily.    atorvastatin (LIPITOR) 80 MG tablet Take 1 tablet (80 mg total) by mouth every evening.    calcium carbonate (OS-KODY) 500  mg calcium (1,250 mg) tablet Take 1 tablet (500 mg total) by mouth once daily.    carvediloL (COREG) 12.5 MG tablet TAKE 1 TABLET BY MOUTH TWICE A DAY WITH MEALS    cholecalciferol, vitamin D3, (VITAMIN D3) 25 mcg (1,000 unit) capsule Take 1,000 Units by mouth once daily. Pt takes 3 tabs daily (3,000) units    clopidogreL (PLAVIX) 75 mg tablet Take 1 tablet (75 mg total) by mouth once daily.    cyanocobalamin, vitamin B-12, 50 mcg tablet Take 50 mcg by mouth once daily.    diclofenac sodium (VOLTAREN) 1 % Gel Apply 2 g topically 4 (four) times daily. To finger for 7 days    docusate sodium (COLACE) 100 MG capsule Take 1 capsule (100 mg total) by mouth 2 (two) times daily. (Patient taking differently: Take 100 mg by mouth 2 (two) times daily as needed.)    DULoxetine (CYMBALTA) 30 MG capsule TAKE 1 CAPSULE (30 MG TOTAL) BY MOUTH 2 (TWO) TIMES DAILY.    ezetimibe (ZETIA) 10 mg tablet Take 1 tablet (10 mg total) by mouth once daily.    famotidine (PEPCID) 40 MG tablet Take 1 tablet (40 mg total) by mouth once daily.    fluticasone furoate-vilanteroL (BREO ELLIPTA) 200-25 mcg/dose DsDv diskus inhaler Inhale 1 puff into the lungs once daily. Controller    furosemide (LASIX) 20 MG tablet Take 1 tablet (20 mg total) by mouth daily as needed (Fluid overload).    hydrocortisone 2.5 % cream Apply to dark patch on leg bid .    irbesartan (AVAPRO) 150 MG tablet TAKE 1 TABLET BY MOUTH EVERY EVENING.    isosorbide mononitrate (IMDUR) 30 MG 24 hr tablet TAKE 1 TABLET BY MOUTH EVERY DAY    ketoconazole (NIZORAL) 2 % cream Have patient mix ketoconazole cream, triamcinolone cream and milk of magnesia in  1:1:1 ratio.  Patient has sterile jar .  Patient to apply mixture to rash bid prn. (Patient taking differently: Have patient mix ketoconazole cream, triamcinolone cream and milk of magnesia in  1:1:1 ratio.  Patient has sterile jar .  Patient to apply mixture to rash bid prn.)    latanoprost (XALATAN) 0.005 % ophthalmic solution  "Place 1 drop into both eyes once daily.    montelukast (SINGULAIR) 10 mg tablet Take 1 tablet (10 mg total) by mouth nightly.    mv-min-folic-calcium carb-K1 400 mcg-500 mg calcium-20 mcg Tab Take 1 tablet by mouth once daily.    NIFEdipine (PROCARDIA-XL) 30 MG (OSM) 24 hr tablet Take 1 tablet (30 mg total) by mouth once daily. for 10 days    nitroGLYCERIN (NITROSTAT) 0.4 MG SL tablet Place 1 tablet (0.4 mg total) under the tongue every 5 (five) minutes as needed for Chest pain. PLACE 1 TABLET (0.4 MG TOTAL) UNDER THE TONGUE EVERY 5 (FIVE) MINUTES AS NEEDED FOR CHEST PAIN.    pregabalin (LYRICA) 75 MG capsule Take 1 capsule (75 mg total) by mouth 2 (two) times daily.    solifenacin (VESICARE) 5 MG tablet Take 1 tablet (5 mg total) by mouth once daily.    triamcinolone acetonide 0.1% (KENALOG) 0.1 % cream Have patient mix triamcinolone cream with ketoconazole cream and milk of magnesia in a 1:1:1 ratio.  Patient has sterile jar .  Apply mixture to rash bid prn. (Patient taking differently: Have patient mix triamcinolone cream with ketoconazole cream and milk of magnesia in a 1:1:1 ratio.  Patient has sterile jar .  Apply mixture to rash bid prn.)    vitamin E 100 UNIT capsule Take 100 Units by mouth once daily. Pt states she takes "2000" units daily   Last reviewed on 1/31/2023  8:17 AM by Mary Jane Houston MA    Review of patient's allergies indicates:   Allergen Reactions    Aspirin Hives    Clindamycin      rash    Metoprolol tartrate Hives    Ranexa [ranolazine] Other (See Comments)     cough    Strawberry Rash    Tramadol Itching    Last reviewed on  1/31/2023 8:16 AM by Mary Jane Houston      Tasks added this encounter   No tasks added.   Tasks due within next 3 months   3/3/2023 - Refill Call (Auto Added)     Antonia Richmond, StantonD  [facility]  [facility] [address]  [facility] [address]  Phone: [phone]  Fax: [phone]        "

## 2023-03-30 ENCOUNTER — SPECIALTY PHARMACY (OUTPATIENT)
Dept: PHARMACY | Facility: CLINIC | Age: 68
End: 2023-03-30
Payer: MEDICARE

## 2023-03-30 NOTE — TELEPHONE ENCOUNTER
Specialty Pharmacy - Refill Coordination    Specialty Medication Orders Linked to Encounter      Flowsheet Row Most Recent Value   Medication #1 alirocumab (PRALUENT PEN) 150 mg/mL PnIj (Order#[id number], Rx#[id number])            Refill Questions - Documented Responses      Flowsheet Row Most Recent Value   Patient Availability and HIPAA Verification    Does patient want to proceed with activity? Yes   HIPAA/medical authority confirmed? Yes   Relationship to patient of person spoken to? Self   Refill Screening Questions    Would patient like to speak to a pharmacist? No   When does the patient need to receive the medication? 04/07/23   Refill Delivery Questions    How will the patient receive the medication? MEDRx   When does the patient need to receive the medication? 04/07/23   Shipping Address Prescription   Address in [facility] confirmed and updated if neccessary? Yes   Expected Copay ($) 0   Is the patient able to afford the medication copay? Yes   Payment Method zero copay   Days supply of Refill 28   Supplies needed? No supplies needed   Refill activity completed? Yes   Refill activity plan Refill scheduled   Shipment/Pickup Date: 04/05/23            Current Outpatient Medications   Medication Sig    acetaminophen (TYLENOL) 650 MG TbSR Take 650 mg by mouth every 8 (eight) hours.    albuterol (PROAIR HFA) 90 mcg/actuation inhaler INHALE 2 PUFFS INTO THE LUNGS 4 (FOUR) TIMES DAILY AS NEEDED.    alendronate (FOSAMAX) 10 MG Tab Take 1 tablet (10 mg total) by mouth once daily.    alirocumab (PRALUENT PEN) 150 mg/mL PnIj Inject 2 mL (300 mg total) into the skin as instructed (once a month).    ammonium lactate (LAC-HYDRIN) 12 % lotion Apply topically 3 (three) times daily. Apply to whole body    ascorbic acid, vitamin C, (VITAMIN C) 1000 MG tablet Take 3,000 mg by mouth once daily.    atorvastatin (LIPITOR) 80 MG tablet Take 1 tablet (80 mg total) by mouth every evening.    calcium carbonate (OS-KODY) 500  mg calcium (1,250 mg) tablet Take 1 tablet (500 mg total) by mouth once daily.    carvediloL (COREG) 12.5 MG tablet TAKE 1 TABLET BY MOUTH TWICE A DAY WITH MEALS    cholecalciferol, vitamin D3, (VITAMIN D3) 25 mcg (1,000 unit) capsule Take 1,000 Units by mouth once daily. Pt takes 3 tabs daily (3,000) units    clopidogreL (PLAVIX) 75 mg tablet TAKE 1 TABLET BY MOUTH EVERY DAY    cyanocobalamin, vitamin B-12, 50 mcg tablet Take 50 mcg by mouth once daily.    diclofenac sodium (VOLTAREN) 1 % Gel Apply 2 g topically 4 (four) times daily. To finger for 7 days    docusate sodium (COLACE) 100 MG capsule Take 1 capsule (100 mg total) by mouth 2 (two) times daily. (Patient taking differently: Take 100 mg by mouth 2 (two) times daily as needed.)    DULoxetine (CYMBALTA) 30 MG capsule TAKE 1 CAPSULE (30 MG TOTAL) BY MOUTH 2 (TWO) TIMES DAILY.    ezetimibe (ZETIA) 10 mg tablet TAKE 1 TABLET BY MOUTH EVERY DAY    famotidine (PEPCID) 40 MG tablet Take 1 tablet (40 mg total) by mouth once daily.    fluticasone furoate-vilanteroL (BREO ELLIPTA) 200-25 mcg/dose DsDv diskus inhaler Inhale 1 puff into the lungs once daily. Controller    furosemide (LASIX) 20 MG tablet Take 1 tablet (20 mg total) by mouth daily as needed (Fluid overload).    hydrocortisone 2.5 % cream Apply to dark patch on leg bid .    irbesartan (AVAPRO) 150 MG tablet TAKE 1 TABLET BY MOUTH EVERY EVENING.    isosorbide mononitrate (IMDUR) 30 MG 24 hr tablet TAKE 1 TABLET BY MOUTH EVERY DAY    ketoconazole (NIZORAL) 2 % cream Have patient mix ketoconazole cream, triamcinolone cream and milk of magnesia in  1:1:1 ratio.  Patient has sterile jar .  Patient to apply mixture to rash bid prn. (Patient taking differently: Have patient mix ketoconazole cream, triamcinolone cream and milk of magnesia in  1:1:1 ratio.  Patient has sterile jar .  Patient to apply mixture to rash bid prn.)    latanoprost (XALATAN) 0.005 % ophthalmic solution Place 1 drop into both eyes once  "daily.    montelukast (SINGULAIR) 10 mg tablet Take 1 tablet (10 mg total) by mouth nightly.    mv-min-folic-calcium carb-K1 400 mcg-500 mg calcium-20 mcg Tab Take 1 tablet by mouth once daily.    NIFEdipine (PROCARDIA-XL) 30 MG (OSM) 24 hr tablet Take 1 tablet (30 mg total) by mouth once daily. for 10 days    nitroGLYCERIN (NITROSTAT) 0.4 MG SL tablet Place 1 tablet (0.4 mg total) under the tongue every 5 (five) minutes as needed for Chest pain. PLACE 1 TABLET (0.4 MG TOTAL) UNDER THE TONGUE EVERY 5 (FIVE) MINUTES AS NEEDED FOR CHEST PAIN.    pregabalin (LYRICA) 75 MG capsule Take 1 capsule (75 mg total) by mouth 2 (two) times daily.    solifenacin (VESICARE) 5 MG tablet Take 1 tablet (5 mg total) by mouth once daily.    triamcinolone acetonide 0.1% (KENALOG) 0.1 % cream Have patient mix triamcinolone cream with ketoconazole cream and milk of magnesia in a 1:1:1 ratio.  Patient has sterile jar .  Apply mixture to rash bid prn. (Patient taking differently: Have patient mix triamcinolone cream with ketoconazole cream and milk of magnesia in a 1:1:1 ratio.  Patient has sterile jar .  Apply mixture to rash bid prn.)    vitamin E 100 UNIT capsule Take 100 Units by mouth once daily. Pt states she takes "2000" units daily   Last reviewed on 1/31/2023  8:17 AM by Mary Jane Houston MA    Review of patient's allergies indicates:   Allergen Reactions    Aspirin Hives    Clindamycin      rash    Metoprolol tartrate Hives    Ranexa [ranolazine] Other (See Comments)     cough    Strawberry Rash    Tramadol Itching    Last reviewed on  1/31/2023 8:16 AM by Mary Jane Houston      Tasks added this encounter   4/28/2023 - Refill Call (Auto Added)   Tasks due within next 3 months   No tasks due.     Modesta Pop, Patient Care Assistant  [facility]  [address] [place] [id number]  Phone: [phone]  Fax: [phone]        "

## 2023-04-28 ENCOUNTER — PATIENT MESSAGE (OUTPATIENT)
Dept: PHARMACY | Facility: CLINIC | Age: 68
End: 2023-04-28
Payer: MEDICARE

## 2023-05-01 ENCOUNTER — SPECIALTY PHARMACY (OUTPATIENT)
Dept: PHARMACY | Facility: CLINIC | Age: 68
End: 2023-05-01
Payer: MEDICARE

## 2023-05-01 NOTE — TELEPHONE ENCOUNTER
Specialty Pharmacy - Refill Coordination    Specialty Medication Orders Linked to Encounter      Flowsheet Row Most Recent Value   Medication #1 alirocumab (PRALUENT PEN) 150 mg/mL PnIj (Order#[id number], Rx#[id number])            Refill Questions - Documented Responses      Flowsheet Row Most Recent Value   Patient Availability and HIPAA Verification    Does patient want to proceed with activity? Yes   HIPAA/medical authority confirmed? Yes   Relationship to patient of person spoken to? Self   Refill Screening Questions    Would patient like to speak to a pharmacist? No   When does the patient need to receive the medication? 05/02/23   Refill Delivery Questions    How will the patient receive the medication? MEDRx   When does the patient need to receive the medication? 05/02/23   Shipping Address Prescription   Address in [facility] confirmed and updated if neccessary? Yes   Expected Copay ($) 0   Is the patient able to afford the medication copay? Yes   Payment Method zero copay   Days supply of Refill 28   Supplies needed? No supplies needed   Refill activity completed? Yes   Refill activity plan Refill scheduled   Shipment/Pickup Date: 05/01/23            Current Outpatient Medications   Medication Sig    acetaminophen (TYLENOL) 650 MG TbSR Take 650 mg by mouth every 8 (eight) hours.    albuterol (PROAIR HFA) 90 mcg/actuation inhaler INHALE 2 PUFFS INTO THE LUNGS 4 (FOUR) TIMES DAILY AS NEEDED.    alendronate (FOSAMAX) 10 MG Tab Take 1 tablet (10 mg total) by mouth once daily.    alirocumab (PRALUENT PEN) 150 mg/mL PnIj Inject 2 mL (300 mg total) into the skin as instructed (once a month).    ammonium lactate (LAC-HYDRIN) 12 % lotion Apply topically 3 (three) times daily. Apply to whole body    ascorbic acid, vitamin C, (VITAMIN C) 1000 MG tablet Take 3,000 mg by mouth once daily.    atorvastatin (LIPITOR) 80 MG tablet Take 1 tablet (80 mg total) by mouth every evening.    calcium carbonate (OS-KODY) 500  mg calcium (1,250 mg) tablet Take 1 tablet (500 mg total) by mouth once daily.    carvediloL (COREG) 12.5 MG tablet TAKE 1 TABLET BY MOUTH TWICE A DAY WITH MEALS    cholecalciferol, vitamin D3, (VITAMIN D3) 25 mcg (1,000 unit) capsule Take 1,000 Units by mouth once daily. Pt takes 3 tabs daily (3,000) units    clopidogreL (PLAVIX) 75 mg tablet TAKE 1 TABLET BY MOUTH EVERY DAY    cyanocobalamin, vitamin B-12, 50 mcg tablet Take 50 mcg by mouth once daily.    diclofenac sodium (VOLTAREN) 1 % Gel Apply 2 g topically 4 (four) times daily. To finger for 7 days    docusate sodium (COLACE) 100 MG capsule Take 1 capsule (100 mg total) by mouth 2 (two) times daily. (Patient taking differently: Take 100 mg by mouth 2 (two) times daily as needed.)    DULoxetine (CYMBALTA) 30 MG capsule TAKE 1 CAPSULE (30 MG TOTAL) BY MOUTH 2 (TWO) TIMES DAILY.    ezetimibe (ZETIA) 10 mg tablet TAKE 1 TABLET BY MOUTH EVERY DAY    famotidine (PEPCID) 40 MG tablet Take 1 tablet (40 mg total) by mouth once daily.    fluticasone furoate-vilanteroL (BREO ELLIPTA) 200-25 mcg/dose DsDv diskus inhaler Inhale 1 puff into the lungs once daily. Controller    furosemide (LASIX) 20 MG tablet Take 1 tablet (20 mg total) by mouth daily as needed (Fluid overload).    hydrocortisone 2.5 % cream Apply to dark patch on leg bid .    irbesartan (AVAPRO) 150 MG tablet TAKE 1 TABLET BY MOUTH EVERY EVENING.    isosorbide mononitrate (IMDUR) 30 MG 24 hr tablet TAKE 1 TABLET BY MOUTH EVERY DAY    ketoconazole (NIZORAL) 2 % cream Have patient mix ketoconazole cream, triamcinolone cream and milk of magnesia in  1:1:1 ratio.  Patient has sterile jar .  Patient to apply mixture to rash bid prn. (Patient taking differently: Have patient mix ketoconazole cream, triamcinolone cream and milk of magnesia in  1:1:1 ratio.  Patient has sterile jar .  Patient to apply mixture to rash bid prn.)    latanoprost (XALATAN) 0.005 % ophthalmic solution Place 1 drop into both eyes once  "daily.    montelukast (SINGULAIR) 10 mg tablet Take 1 tablet (10 mg total) by mouth nightly.    mv-min-folic-calcium carb-K1 400 mcg-500 mg calcium-20 mcg Tab Take 1 tablet by mouth once daily.    NIFEdipine (PROCARDIA-XL) 30 MG (OSM) 24 hr tablet Take 1 tablet (30 mg total) by mouth once daily. for 10 days    nitroGLYCERIN (NITROSTAT) 0.4 MG SL tablet Place 1 tablet (0.4 mg total) under the tongue every 5 (five) minutes as needed for Chest pain. PLACE 1 TABLET (0.4 MG TOTAL) UNDER THE TONGUE EVERY 5 (FIVE) MINUTES AS NEEDED FOR CHEST PAIN.    pregabalin (LYRICA) 75 MG capsule Take 1 capsule (75 mg total) by mouth 2 (two) times daily.    solifenacin (VESICARE) 5 MG tablet Take 1 tablet (5 mg total) by mouth once daily.    triamcinolone acetonide 0.1% (KENALOG) 0.1 % cream Have patient mix triamcinolone cream with ketoconazole cream and milk of magnesia in a 1:1:1 ratio.  Patient has sterile jar .  Apply mixture to rash bid prn. (Patient taking differently: Have patient mix triamcinolone cream with ketoconazole cream and milk of magnesia in a 1:1:1 ratio.  Patient has sterile jar .  Apply mixture to rash bid prn.)    vitamin E 100 UNIT capsule Take 100 Units by mouth once daily. Pt states she takes "2000" units daily   Last reviewed on 1/31/2023  8:17 AM by Mary Jane Houston MA    Review of patient's allergies indicates:   Allergen Reactions    Aspirin Hives    Clindamycin      rash    Metoprolol tartrate Hives    Ranexa [ranolazine] Other (See Comments)     cough    Strawberry Rash    Tramadol Itching    Last reviewed on  1/31/2023 8:16 AM by Mary Jane Houston      Tasks added this encounter   No tasks added.   Tasks due within next 3 months   5/1/2023 - Refill Coordination Outreach (1 time occurrence)     [facility]  [address] [place] [address]  Phone: [phone]  Fax: [phone]        "

## 2023-05-23 ENCOUNTER — SPECIALTY PHARMACY (OUTPATIENT)
Dept: PHARMACY | Facility: CLINIC | Age: 68
End: 2023-05-23
Payer: MEDICARE

## 2023-05-23 NOTE — TELEPHONE ENCOUNTER
Specialty Pharmacy - Refill Coordination    Specialty Medication Orders Linked to Encounter      Flowsheet Row Most Recent Value   Medication #1 alirocumab (PRALUENT PEN) 150 mg/mL PnIj (Order#[id number], Rx#[id number])            Refill Questions - Documented Responses      Flowsheet Row Most Recent Value   Patient Availability and HIPAA Verification    Does patient want to proceed with activity? Yes   HIPAA/medical authority confirmed? Yes   Relationship to patient of person spoken to? Self   Refill Screening Questions    Would patient like to speak to a pharmacist? No   When does the patient need to receive the medication? 05/31/23   Refill Delivery Questions    How will the patient receive the medication? MEDRx   When does the patient need to receive the medication? 05/31/23   Shipping Address Home   Address in [facility] confirmed and updated if neccessary? Yes   Expected Copay ($) 0   Is the patient able to afford the medication copay? Yes   Payment Method zero copay   Days supply of Refill 30   Supplies needed? No supplies needed   Refill activity completed? Yes   Refill activity plan Refill scheduled   Shipment/Pickup Date: 05/30/23            Current Outpatient Medications   Medication Sig    acetaminophen (TYLENOL) 650 MG TbSR Take 650 mg by mouth every 8 (eight) hours.    albuterol (PROAIR HFA) 90 mcg/actuation inhaler INHALE 2 PUFFS INTO THE LUNGS 4 (FOUR) TIMES DAILY AS NEEDED.    alendronate (FOSAMAX) 10 MG Tab Take 1 tablet (10 mg total) by mouth once daily.    alirocumab (PRALUENT PEN) 150 mg/mL PnIj Inject 2 mL (300 mg total) into the skin as instructed (once a month).    ammonium lactate (LAC-HYDRIN) 12 % lotion Apply topically 3 (three) times daily. Apply to whole body    ascorbic acid, vitamin C, (VITAMIN C) 1000 MG tablet Take 3,000 mg by mouth once daily.    atorvastatin (LIPITOR) 80 MG tablet Take 1 tablet (80 mg total) by mouth every evening.    calcium carbonate (OS-KODY) 500 mg  calcium (1,250 mg) tablet Take 1 tablet (500 mg total) by mouth once daily.    carvediloL (COREG) 12.5 MG tablet TAKE 1 TABLET BY MOUTH TWICE A DAY WITH MEALS    cholecalciferol, vitamin D3, (VITAMIN D3) 25 mcg (1,000 unit) capsule Take 1,000 Units by mouth once daily. Pt takes 3 tabs daily (3,000) units    clobetasoL (TEMOVATE) 0.05 % cream Apply topically 2 (two) times daily. Apply small amount to left leg only .    clopidogreL (PLAVIX) 75 mg tablet TAKE 1 TABLET BY MOUTH EVERY DAY    cyanocobalamin, vitamin B-12, 50 mcg tablet Take 50 mcg by mouth once daily.    diclofenac sodium (VOLTAREN) 1 % Gel Apply 2 g topically 4 (four) times daily. To finger for 7 days    docusate sodium (COLACE) 100 MG capsule Take 1 capsule (100 mg total) by mouth 2 (two) times daily. (Patient taking differently: Take 100 mg by mouth 2 (two) times daily as needed.)    DULoxetine (CYMBALTA) 30 MG capsule TAKE 1 CAPSULE (30 MG TOTAL) BY MOUTH 2 (TWO) TIMES DAILY.    ezetimibe (ZETIA) 10 mg tablet TAKE 1 TABLET BY MOUTH EVERY DAY    famotidine (PEPCID) 40 MG tablet Take 1 tablet (40 mg total) by mouth once daily.    fluticasone furoate-vilanteroL (BREO ELLIPTA) 200-25 mcg/dose DsDv diskus inhaler Inhale 1 puff into the lungs once daily. Controller    furosemide (LASIX) 20 MG tablet TAKE 1 TABLET BY MOUTH DAILY AS NEEDED FOR FLUID OVERLOAD    hydrocortisone 2.5 % cream Apply to dark patch on leg bid .    irbesartan (AVAPRO) 150 MG tablet TAKE 1 TABLET BY MOUTH EVERY EVENING.    isosorbide mononitrate (IMDUR) 30 MG 24 hr tablet TAKE 1 TABLET BY MOUTH EVERY DAY    ketoconazole (NIZORAL) 2 % cream Have patient mix ketoconazole cream, triamcinolone cream and milk of magnesia in  1:1:1 ratio.  Patient has sterile jar .  Patient to apply mixture to rash bid prn. (Patient taking differently: Have patient mix ketoconazole cream, triamcinolone cream and milk of magnesia in  1:1:1 ratio.  Patient has sterile jar .  Patient to apply mixture to rash  "bid prn.)    latanoprost (XALATAN) 0.005 % ophthalmic solution Place 1 drop into both eyes once daily.    montelukast (SINGULAIR) 10 mg tablet Take 1 tablet (10 mg total) by mouth nightly.    mupirocin (BACTROBAN) 2 % ointment Apply topically 3 (three) times daily. Apply to incest bites as needed.    mv-min-folic-calcium carb-K1 400 mcg-500 mg calcium-20 mcg Tab Take 1 tablet by mouth once daily.    NIFEdipine (PROCARDIA-XL) 30 MG (OSM) 24 hr tablet Take 1 tablet (30 mg total) by mouth once daily. for 10 days    nitroGLYCERIN (NITROSTAT) 0.4 MG SL tablet Place 1 tablet (0.4 mg total) under the tongue every 5 (five) minutes as needed for Chest pain. PLACE 1 TABLET (0.4 MG TOTAL) UNDER THE TONGUE EVERY 5 (FIVE) MINUTES AS NEEDED FOR CHEST PAIN.    pregabalin (LYRICA) 75 MG capsule Take 1 capsule (75 mg total) by mouth 2 (two) times daily. (Patient not taking: Reported on 5/16/2023)    RESTASIS 0.05 % ophthalmic emulsion Place 1 drop into both eyes 2 (two) times daily.    solifenacin (VESICARE) 5 MG tablet Take 1 tablet (5 mg total) by mouth once daily.    triamcinolone acetonide 0.1% (KENALOG) 0.1 % cream Have patient mix triamcinolone cream with ketoconazole cream and milk of magnesia in a 1:1:1 ratio.  Patient has sterile jar .  Apply mixture to rash bid prn. (Patient taking differently: Have patient mix triamcinolone cream with ketoconazole cream and milk of magnesia in a 1:1:1 ratio.  Patient has sterile jar .  Apply mixture to rash bid prn.)    vitamin E 100 UNIT capsule Take 100 Units by mouth once daily. Pt states she takes "2000" units daily   Last reviewed on 5/16/2023 12:25 PM by Kevin Gunderson MD    Review of patient's allergies indicates:   Allergen Reactions    Aspirin Hives    Clindamycin      rash    Metoprolol tartrate Hives    Ranexa [ranolazine] Other (See Comments)     cough    Strawberry Rash    Tramadol Itching    Last reviewed on  5/16/2023 12:25 PM by Kevin Bernal added this " encounter   No tasks added.   Tasks due within next 3 months   No tasks due.     [facility]  [address] [facility] [address]  Phone: [phone]  Fax: [phone]

## 2023-06-20 ENCOUNTER — SPECIALTY PHARMACY (OUTPATIENT)
Dept: PHARMACY | Facility: CLINIC | Age: 68
End: 2023-06-20
Payer: MEDICARE

## 2023-06-20 NOTE — TELEPHONE ENCOUNTER
Specialty Pharmacy - Refill Coordination    Specialty Medication Orders Linked to Encounter      Flowsheet Row Most Recent Value   Medication #1 alirocumab (PRALUENT PEN) 150 mg/mL PnIj (Order#[id number], Rx#[id number])            Refill Questions - Documented Responses      Flowsheet Row Most Recent Value   Patient Availability and HIPAA Verification    Does patient want to proceed with activity? Yes   HIPAA/medical authority confirmed? Yes   Relationship to patient of person spoken to? Self   Refill Screening Questions    Would patient like to speak to a pharmacist? No   When does the patient need to receive the medication? 06/24/23   Refill Delivery Questions    How will the patient receive the medication? MEDRx   When does the patient need to receive the medication? 06/24/23   Shipping Address Prescription   Address in [facility] confirmed and updated if neccessary? Yes   Expected Copay ($) 0   Is the patient able to afford the medication copay? Yes   Payment Method zero copay   Days supply of Refill 28   Supplies needed? No supplies needed   Refill activity completed? Yes   Refill activity plan Refill scheduled   Shipment/Pickup Date: 06/21/23            Current Outpatient Medications   Medication Sig    acetaminophen (TYLENOL) 650 MG TbSR Take 650 mg by mouth every 8 (eight) hours.    albuterol (PROAIR HFA) 90 mcg/actuation inhaler INHALE 2 PUFFS INTO THE LUNGS 4 (FOUR) TIMES DAILY AS NEEDED.    alendronate (FOSAMAX) 10 MG Tab Take 1 tablet (10 mg total) by mouth once daily.    alirocumab (PRALUENT PEN) 150 mg/mL PnIj Inject 2 mL (300 mg total) into the skin as instructed (once a month).    ammonium lactate (LAC-HYDRIN) 12 % lotion Apply topically 3 (three) times daily. Apply to whole body    ascorbic acid, vitamin C, (VITAMIN C) 1000 MG tablet Take 3,000 mg by mouth once daily.    atorvastatin (LIPITOR) 80 MG tablet Take 1 tablet (80 mg total) by mouth every evening.    calcium carbonate (OS-KODY) 500  mg calcium (1,250 mg) tablet Take 1 tablet (500 mg total) by mouth once daily.    carvediloL (COREG) 12.5 MG tablet TAKE 1 TABLET BY MOUTH TWICE A DAY WITH MEALS    cholecalciferol, vitamin D3, (VITAMIN D3) 25 mcg (1,000 unit) capsule Take 1,000 Units by mouth once daily. Pt takes 3 tabs daily (3,000) units    clobetasoL (TEMOVATE) 0.05 % cream Apply topically 2 (two) times daily. Apply small amount to left leg only .    clopidogreL (PLAVIX) 75 mg tablet TAKE 1 TABLET BY MOUTH EVERY DAY    cyanocobalamin, vitamin B-12, 50 mcg tablet Take 50 mcg by mouth once daily.    diclofenac sodium (VOLTAREN) 1 % Gel Apply 2 g topically 4 (four) times daily. To finger for 7 days    docusate sodium (COLACE) 100 MG capsule Take 1 capsule (100 mg total) by mouth 2 (two) times daily. (Patient taking differently: Take 100 mg by mouth 2 (two) times daily as needed.)    DULoxetine (CYMBALTA) 30 MG capsule TAKE 1 CAPSULE (30 MG TOTAL) BY MOUTH 2 (TWO) TIMES DAILY.    ezetimibe (ZETIA) 10 mg tablet TAKE 1 TABLET BY MOUTH EVERY DAY    famotidine (PEPCID) 40 MG tablet Take 1 tablet (40 mg total) by mouth once daily.    fluticasone furoate-vilanteroL (BREO ELLIPTA) 200-25 mcg/dose DsDv diskus inhaler Inhale 1 puff into the lungs once daily. Controller    furosemide (LASIX) 20 MG tablet TAKE 1 TABLET BY MOUTH DAILY AS NEEDED FOR FLUID OVERLOAD    hydrocortisone 2.5 % cream Apply to dark patch on leg bid .    irbesartan (AVAPRO) 150 MG tablet TAKE 1 TABLET BY MOUTH EVERY EVENING.    isosorbide mononitrate (IMDUR) 30 MG 24 hr tablet TAKE 1 TABLET BY MOUTH EVERY DAY    ketoconazole (NIZORAL) 2 % cream Have patient mix ketoconazole cream, triamcinolone cream and milk of magnesia in  1:1:1 ratio.  Patient has sterile jar .  Patient to apply mixture to rash bid prn. (Patient taking differently: Have patient mix ketoconazole cream, triamcinolone cream and milk of magnesia in  1:1:1 ratio.  Patient has sterile jar .  Patient to apply mixture to rash  "bid prn.)    latanoprost (XALATAN) 0.005 % ophthalmic solution Place 1 drop into both eyes once daily.    montelukast (SINGULAIR) 10 mg tablet Take 1 tablet (10 mg total) by mouth nightly.    mupirocin (BACTROBAN) 2 % ointment Apply topically 3 (three) times daily. Apply to incest bites as needed.    mv-min-folic-calcium carb-K1 400 mcg-500 mg calcium-20 mcg Tab Take 1 tablet by mouth once daily.    nitroGLYCERIN (NITROSTAT) 0.4 MG SL tablet Place 1 tablet (0.4 mg total) under the tongue every 5 (five) minutes as needed for Chest pain. PLACE 1 TABLET (0.4 MG TOTAL) UNDER THE TONGUE EVERY 5 (FIVE) MINUTES AS NEEDED FOR CHEST PAIN.    pregabalin (LYRICA) 75 MG capsule Take 1 capsule (75 mg total) by mouth 2 (two) times daily. (Patient not taking: Reported on 5/16/2023)    RESTASIS 0.05 % ophthalmic emulsion Place 1 drop into both eyes 2 (two) times daily.    solifenacin (VESICARE) 5 MG tablet Take 1 tablet (5 mg total) by mouth once daily.    triamcinolone acetonide 0.1% (KENALOG) 0.1 % cream Have patient mix triamcinolone cream with ketoconazole cream and milk of magnesia in a 1:1:1 ratio.  Patient has sterile jar .  Apply mixture to rash bid prn. (Patient taking differently: Have patient mix triamcinolone cream with ketoconazole cream and milk of magnesia in a 1:1:1 ratio.  Patient has sterile jar .  Apply mixture to rash bid prn.)    vitamin E 100 UNIT capsule Take 100 Units by mouth once daily. Pt states she takes "2000" units daily   Last reviewed on 5/25/2023  2:32 PM by Ifeanyi Walton MD    Review of patient's allergies indicates:   Allergen Reactions    Aspirin Hives    Clindamycin      rash    Metoprolol tartrate Hives    Ranexa [ranolazine] Other (See Comments)     cough    Strawberry Rash    Tramadol Itching    Last reviewed on  5/25/2023 2:32 PM by Ifeanyi Walton      Tasks added this encounter   No tasks added.   Tasks due within next 3 months   No tasks due.     [facility]  [address] [facility] [address]  Phone: [phone]  Fax: [phone]

## 2023-07-03 ENCOUNTER — OFFICE VISIT (OUTPATIENT)
Dept: NEUROLOGY | Facility: CLINIC | Age: 68
End: 2023-07-03
Payer: MEDICARE

## 2023-07-03 VITALS
HEIGHT: 65 IN | SYSTOLIC BLOOD PRESSURE: 114 MMHG | RESPIRATION RATE: 16 BRPM | DIASTOLIC BLOOD PRESSURE: 82 MMHG | WEIGHT: 293 LBS | BODY MASS INDEX: 48.82 KG/M2 | HEART RATE: 72 BPM

## 2023-07-03 DIAGNOSIS — M54.2 NECK PAIN: ICD-10-CM

## 2023-07-03 DIAGNOSIS — R20.0 NUMBNESS AND TINGLING IN BOTH HANDS: Primary | ICD-10-CM

## 2023-07-03 DIAGNOSIS — M50.90 CERVICAL DISC DISEASE: ICD-10-CM

## 2023-07-03 DIAGNOSIS — R20.2 NUMBNESS AND TINGLING IN BOTH HANDS: Primary | ICD-10-CM

## 2023-07-03 DIAGNOSIS — M65.30 TRIGGER FINGER OF LEFT HAND, UNSPECIFIED FINGER: ICD-10-CM

## 2023-07-03 DIAGNOSIS — I73.00 RAYNAUD'S DISEASE WITHOUT GANGRENE: ICD-10-CM

## 2023-07-03 PROCEDURE — 3079F DIAST BP 80-89 MM HG: CPT | Mod: CPTII,S$GLB,, | Performed by: PSYCHIATRY & NEUROLOGY

## 2023-07-03 PROCEDURE — 3008F PR BODY MASS INDEX (BMI) DOCUMENTED: ICD-10-PCS | Mod: CPTII,S$GLB,, | Performed by: PSYCHIATRY & NEUROLOGY

## 2023-07-03 PROCEDURE — 1159F PR MEDICATION LIST DOCUMENTED IN MEDICAL RECORD: ICD-10-PCS | Mod: CPTII,S$GLB,, | Performed by: PSYCHIATRY & NEUROLOGY

## 2023-07-03 PROCEDURE — 3074F PR MOST RECENT SYSTOLIC BLOOD PRESSURE < 130 MM HG: ICD-10-PCS | Mod: CPTII,S$GLB,, | Performed by: PSYCHIATRY & NEUROLOGY

## 2023-07-03 PROCEDURE — 3008F BODY MASS INDEX DOCD: CPT | Mod: CPTII,S$GLB,, | Performed by: PSYCHIATRY & NEUROLOGY

## 2023-07-03 PROCEDURE — 99204 OFFICE O/P NEW MOD 45 MIN: CPT | Mod: S$GLB,,, | Performed by: PSYCHIATRY & NEUROLOGY

## 2023-07-03 PROCEDURE — 1160F RVW MEDS BY RX/DR IN RCRD: CPT | Mod: CPTII,S$GLB,, | Performed by: PSYCHIATRY & NEUROLOGY

## 2023-07-03 PROCEDURE — 1159F MED LIST DOCD IN RCRD: CPT | Mod: CPTII,S$GLB,, | Performed by: PSYCHIATRY & NEUROLOGY

## 2023-07-03 PROCEDURE — 99999 PR PBB SHADOW E&M-EST. PATIENT-LVL V: CPT | Mod: PBBFAC,,, | Performed by: PSYCHIATRY & NEUROLOGY

## 2023-07-03 PROCEDURE — 4010F PR ACE/ARB THEARPY RXD/TAKEN: ICD-10-PCS | Mod: CPTII,S$GLB,, | Performed by: PSYCHIATRY & NEUROLOGY

## 2023-07-03 PROCEDURE — 1126F AMNT PAIN NOTED NONE PRSNT: CPT | Mod: CPTII,S$GLB,, | Performed by: PSYCHIATRY & NEUROLOGY

## 2023-07-03 PROCEDURE — 4010F ACE/ARB THERAPY RXD/TAKEN: CPT | Mod: CPTII,S$GLB,, | Performed by: PSYCHIATRY & NEUROLOGY

## 2023-07-03 PROCEDURE — 1160F PR REVIEW ALL MEDS BY PRESCRIBER/CLIN PHARMACIST DOCUMENTED: ICD-10-PCS | Mod: CPTII,S$GLB,, | Performed by: PSYCHIATRY & NEUROLOGY

## 2023-07-03 PROCEDURE — 3079F PR MOST RECENT DIASTOLIC BLOOD PRESSURE 80-89 MM HG: ICD-10-PCS | Mod: CPTII,S$GLB,, | Performed by: PSYCHIATRY & NEUROLOGY

## 2023-07-03 PROCEDURE — 99204 PR OFFICE/OUTPT VISIT, NEW, LEVL IV, 45-59 MIN: ICD-10-PCS | Mod: S$GLB,,, | Performed by: PSYCHIATRY & NEUROLOGY

## 2023-07-03 PROCEDURE — 99999 PR PBB SHADOW E&M-EST. PATIENT-LVL V: ICD-10-PCS | Mod: PBBFAC,,, | Performed by: PSYCHIATRY & NEUROLOGY

## 2023-07-03 PROCEDURE — 1126F PR PAIN SEVERITY QUANTIFIED, NO PAIN PRESENT: ICD-10-PCS | Mod: CPTII,S$GLB,, | Performed by: PSYCHIATRY & NEUROLOGY

## 2023-07-03 PROCEDURE — 3074F SYST BP LT 130 MM HG: CPT | Mod: CPTII,S$GLB,, | Performed by: PSYCHIATRY & NEUROLOGY
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
## 2023-07-12 ENCOUNTER — SPECIALTY PHARMACY (OUTPATIENT)
Dept: PHARMACY | Facility: CLINIC | Age: 68
End: 2023-07-12
Payer: MEDICARE

## 2023-07-12 NOTE — TELEPHONE ENCOUNTER
Specialty Pharmacy - Refill Coordination    Specialty Medication Orders Linked to Encounter      Flowsheet Row Most Recent Value   Medication #1 alirocumab (PRALUENT PEN) 150 mg/mL PnIj (Order#[id number], Rx#[id number])            Refill Questions - Documented Responses      Flowsheet Row Most Recent Value   Patient Availability and HIPAA Verification    Does patient want to proceed with activity? Yes   HIPAA/medical authority confirmed? Yes   Relationship to patient of person spoken to? Self   Refill Screening Questions    Would patient like to speak to a pharmacist? No   When does the patient need to receive the medication? 07/20/23   Refill Delivery Questions    How will the patient receive the medication? MEDRx   When does the patient need to receive the medication? 07/20/23   Shipping Address Prescription   Address in [facility] confirmed and updated if neccessary? Yes   Expected Copay ($) 0   Is the patient able to afford the medication copay? Yes   Payment Method zero copay   Days supply of Refill 28   Supplies needed? No supplies needed   Refill activity completed? Yes   Refill activity plan Refill scheduled   Shipment/Pickup Date: 07/17/23            Current Outpatient Medications   Medication Sig    acetaminophen (TYLENOL) 650 MG TbSR Take 650 mg by mouth every 8 (eight) hours.    albuterol (PROAIR HFA) 90 mcg/actuation inhaler INHALE 2 PUFFS INTO THE LUNGS 4 (FOUR) TIMES DAILY AS NEEDED.    alendronate (FOSAMAX) 10 MG Tab Take 1 tablet (10 mg total) by mouth once daily.    alirocumab (PRALUENT PEN) 150 mg/mL PnIj Inject 2 mL (300 mg total) into the skin as instructed (once a month).    ammonium lactate (LAC-HYDRIN) 12 % lotion Apply topically 3 (three) times daily. Apply to whole body    ascorbic acid, vitamin C, (VITAMIN C) 1000 MG tablet Take 3,000 mg by mouth once daily.    atorvastatin (LIPITOR) 80 MG tablet Take 1 tablet (80 mg total) by mouth every evening.    calcium carbonate (OS-KODY) 500  mg calcium (1,250 mg) tablet Take 1 tablet (500 mg total) by mouth once daily.    carvediloL (COREG) 12.5 MG tablet TAKE 1 TABLET BY MOUTH TWICE A DAY WITH MEALS    cholecalciferol, vitamin D3, (VITAMIN D3) 25 mcg (1,000 unit) capsule Take 3,000 Units by mouth once daily.    clobetasoL (TEMOVATE) 0.05 % cream Apply topically 2 (two) times daily. Apply small amount to left leg only .    clopidogreL (PLAVIX) 75 mg tablet TAKE 1 TABLET BY MOUTH EVERY DAY    cyanocobalamin, vitamin B-12, 50 mcg tablet Take 50 mcg by mouth every 30 days.    docusate sodium (COLACE) 100 MG capsule Take 1 capsule (100 mg total) by mouth 2 (two) times daily. (Patient taking differently: Take 100 mg by mouth 2 (two) times daily as needed.)    ezetimibe (ZETIA) 10 mg tablet TAKE 1 TABLET BY MOUTH EVERY DAY    famotidine (PEPCID) 40 MG tablet Take 1 tablet (40 mg total) by mouth once daily.    fluticasone furoate-vilanteroL (BREO ELLIPTA) 200-25 mcg/dose DsDv diskus inhaler Inhale 1 puff into the lungs once daily. Controller    furosemide (LASIX) 20 MG tablet TAKE 1 TABLET BY MOUTH DAILY AS NEEDED FOR FLUID OVERLOAD    hydrocortisone 2.5 % cream Apply to dark patch on leg bid .    irbesartan (AVAPRO) 150 MG tablet TAKE 1 TABLET BY MOUTH EVERY EVENING.    isosorbide mononitrate (IMDUR) 30 MG 24 hr tablet TAKE 1 TABLET BY MOUTH EVERY DAY    ketoconazole (NIZORAL) 2 % cream Have patient mix ketoconazole cream, triamcinolone cream and milk of magnesia in  1:1:1 ratio.  Patient has sterile jar .  Patient to apply mixture to rash bid prn. (Patient taking differently: Have patient mix ketoconazole cream, triamcinolone cream and milk of magnesia in  1:1:1 ratio.  Patient has sterile jar .  Patient to apply mixture to rash bid prn.)    latanoprost (XALATAN) 0.005 % ophthalmic solution Place 1 drop into both eyes once daily.    montelukast (SINGULAIR) 10 mg tablet Take 1 tablet (10 mg total) by mouth nightly.    mupirocin (BACTROBAN) 2 % ointment Apply  "topically 3 (three) times daily. Apply to incest bites as needed.    mv-min-folic-calcium carb-K1 400 mcg-500 mg calcium-20 mcg Tab Take 1 tablet by mouth once daily.    nitroGLYCERIN (NITROSTAT) 0.4 MG SL tablet Place 1 tablet (0.4 mg total) under the tongue every 5 (five) minutes as needed for Chest pain. PLACE 1 TABLET (0.4 MG TOTAL) UNDER THE TONGUE EVERY 5 (FIVE) MINUTES AS NEEDED FOR CHEST PAIN. (Patient not taking: Reported on 7/3/2023)    RESTASIS 0.05 % ophthalmic emulsion Place 1 drop into both eyes 2 (two) times daily.    solifenacin (VESICARE) 5 MG tablet Take 1 tablet (5 mg total) by mouth once daily.    vitamin E 100 UNIT capsule Take 100 Units by mouth once daily. Pt states she takes "2000" units daily   Last reviewed on 7/3/2023  3:18 PM by Norbert Hayes MD    Review of patient's allergies indicates:   Allergen Reactions    Aspirin Hives    Clindamycin      rash    Metoprolol tartrate Hives    Ranexa [ranolazine] Other (See Comments)     cough    Strawberry Rash    Tramadol Itching    Last reviewed on  7/3/2023 3:03 PM by Chen Koo      Tasks added this encounter   No tasks added.   Tasks due within next 3 months   7/12/2023 - Refill Coordination Outreach (1 time occurrence)     [facility]  [address] [place] [id number]  Phone: [phone]  Fax: [phone]        "

## 2023-08-07 ENCOUNTER — SPECIALTY PHARMACY (OUTPATIENT)
Dept: PHARMACY | Facility: CLINIC | Age: 68
End: 2023-08-07
Payer: MEDICARE

## 2023-08-07 NOTE — TELEPHONE ENCOUNTER
Specialty Pharmacy - Refill Coordination    Specialty Medication Orders Linked to Encounter      Flowsheet Row Most Recent Value   Medication #1 alirocumab (PRALUENT PEN) 150 mg/mL PnIj (Order#[id number], Rx#[id number])            Refill Questions - Documented Responses      Flowsheet Row Most Recent Value   Patient Availability and HIPAA Verification    Does patient want to proceed with activity? Yes   Relationship to patient of person spoken to? Self   Refill Screening Questions    Would patient like to speak to a pharmacist? No   When does the patient need to receive the medication? 08/15/23   Refill Delivery Questions    How will the patient receive the medication? MEDRx   When does the patient need to receive the medication? 08/15/23   Shipping Address Prescription   Address in [facility] confirmed and updated if neccessary? Yes   Expected Copay ($) 0   Is the patient able to afford the medication copay? Yes   Days supply of Refill 28   Supplies needed? No supplies needed   Refill activity completed? Yes   Refill activity plan Refill scheduled   Shipment/Pickup Date: 08/09/23            Current Outpatient Medications   Medication Sig    acetaminophen (TYLENOL) 650 MG TbSR Take 650 mg by mouth every 8 (eight) hours.    albuterol (PROAIR HFA) 90 mcg/actuation inhaler INHALE 2 PUFFS INTO THE LUNGS 4 (FOUR) TIMES DAILY AS NEEDED.    alendronate (FOSAMAX) 10 MG Tab Take 1 tablet (10 mg total) by mouth once daily.    alirocumab (PRALUENT PEN) 150 mg/mL PnIj Inject 2 mL (300 mg total) into the skin as instructed (once a month).    ammonium lactate (LAC-HYDRIN) 12 % lotion Apply topically 3 (three) times daily. Apply to whole body    ascorbic acid, vitamin C, (VITAMIN C) 1000 MG tablet Take 3,000 mg by mouth once daily.    atorvastatin (LIPITOR) 80 MG tablet Take 1 tablet (80 mg total) by mouth every evening.    calcium carbonate (OS-KODY) 500 mg calcium (1,250 mg) tablet Take 1 tablet (500 mg total) by mouth  once daily.    carvediloL (COREG) 12.5 MG tablet TAKE 1 TABLET BY MOUTH TWICE A DAY WITH MEALS    cholecalciferol, vitamin D3, (VITAMIN D3) 25 mcg (1,000 unit) capsule Take 3,000 Units by mouth once daily.    clobetasoL (TEMOVATE) 0.05 % cream Apply topically 2 (two) times daily. Apply small amount to left leg only .    clopidogreL (PLAVIX) 75 mg tablet TAKE 1 TABLET BY MOUTH EVERY DAY    cyanocobalamin, vitamin B-12, 50 mcg tablet Take 50 mcg by mouth every 30 days.    docusate sodium (COLACE) 100 MG capsule Take 1 capsule (100 mg total) by mouth 2 (two) times daily. (Patient taking differently: Take 100 mg by mouth 2 (two) times daily as needed.)    ezetimibe (ZETIA) 10 mg tablet TAKE 1 TABLET BY MOUTH EVERY DAY    famotidine (PEPCID) 40 MG tablet Take 1 tablet (40 mg total) by mouth once daily.    fluticasone furoate-vilanteroL (BREO ELLIPTA) 200-25 mcg/dose DsDv diskus inhaler Inhale 1 puff into the lungs once daily. Controller    furosemide (LASIX) 20 MG tablet TAKE 1 TABLET BY MOUTH DAILY AS NEEDED FOR FLUID OVERLOAD    hydrocortisone 2.5 % cream Apply to dark patch on leg bid .    irbesartan (AVAPRO) 150 MG tablet TAKE 1 TABLET BY MOUTH EVERY EVENING.    isosorbide mononitrate (IMDUR) 30 MG 24 hr tablet TAKE 1 TABLET BY MOUTH EVERY DAY    ketoconazole (NIZORAL) 2 % cream Have patient mix ketoconazole cream, triamcinolone cream and milk of magnesia in  1:1:1 ratio.  Patient has sterile jar .  Patient to apply mixture to rash bid prn. (Patient taking differently: Have patient mix ketoconazole cream, triamcinolone cream and milk of magnesia in  1:1:1 ratio.  Patient has sterile jar .  Patient to apply mixture to rash bid prn.)    latanoprost (XALATAN) 0.005 % ophthalmic solution Place 1 drop into both eyes once daily.    montelukast (SINGULAIR) 10 mg tablet Take 1 tablet (10 mg total) by mouth nightly.    mupirocin (BACTROBAN) 2 % ointment Apply topically 3 (three) times daily. Apply to incest bites as needed.  "   mv-min-folic-calcium carb-K1 400 mcg-500 mg calcium-20 mcg Tab Take 1 tablet by mouth once daily.    nitroGLYCERIN (NITROSTAT) 0.4 MG SL tablet Place 1 tablet (0.4 mg total) under the tongue every 5 (five) minutes as needed for Chest pain. PLACE 1 TABLET (0.4 MG TOTAL) UNDER THE TONGUE EVERY 5 (FIVE) MINUTES AS NEEDED FOR CHEST PAIN.    RESTASIS 0.05 % ophthalmic emulsion Place 1 drop into both eyes 2 (two) times daily.    solifenacin (VESICARE) 5 MG tablet Take 1 tablet (5 mg total) by mouth once daily.    vitamin E 100 UNIT capsule Take 100 Units by mouth once daily. Pt states she takes "2000" units daily   Last reviewed on 8/4/2023  8:50 AM by Emelyn Moralez, NP    Review of patient's allergies indicates:   Allergen Reactions    Aspirin Hives    Clindamycin      rash    Metoprolol tartrate Hives    Ranexa [ranolazine] Other (See Comments)     cough    Strawberry Rash    Tramadol Itching    Last reviewed on  8/4/2023 8:50 AM by Emelyn Moralez      Tasks added this encounter   No tasks added.   Tasks due within next 3 months   8/7/2023 - Refill Coordination Outreach (1 time occurrence)     [facility]ty [facility]  [facility]  [facility] [address]  Phone: [phone]  Fax: [phone]        "

## 2023-08-14 ENCOUNTER — PROCEDURE VISIT (OUTPATIENT)
Dept: NEUROLOGY | Facility: CLINIC | Age: 68
End: 2023-08-14
Payer: MEDICARE

## 2023-08-14 DIAGNOSIS — M54.12 CERVICAL RADICULAR PAIN: Primary | ICD-10-CM

## 2023-08-14 DIAGNOSIS — M50.90 CERVICAL DISC DISEASE: ICD-10-CM

## 2023-08-14 DIAGNOSIS — R20.2 NUMBNESS AND TINGLING IN BOTH HANDS: ICD-10-CM

## 2023-08-14 DIAGNOSIS — R20.0 NUMBNESS AND TINGLING IN BOTH HANDS: ICD-10-CM

## 2023-08-14 DIAGNOSIS — G56.01 RIGHT CARPAL TUNNEL SYNDROME: ICD-10-CM

## 2023-08-14 PROCEDURE — 95885 PR MUSC TST DONE W/NERV TST LIM: ICD-10-PCS | Mod: 53,S$GLB,, | Performed by: PSYCHIATRY & NEUROLOGY

## 2023-08-14 PROCEDURE — 95909 NRV CNDJ TST 5-6 STUDIES: CPT | Mod: S$GLB,,, | Performed by: PSYCHIATRY & NEUROLOGY

## 2023-08-14 PROCEDURE — 95909 PR NERVE CONDUCTION STUDY; 5-6 STUDIES: ICD-10-PCS | Mod: S$GLB,,, | Performed by: PSYCHIATRY & NEUROLOGY

## 2023-08-14 PROCEDURE — 95885 MUSC TST DONE W/NERV TST LIM: CPT | Mod: 53,S$GLB,, | Performed by: PSYCHIATRY & NEUROLOGY

## 2023-08-14 NOTE — PROCEDURES
EMG W/ ULTRASOUND AND NERVE CONDUCTION TEST 2 Extremities    Date/Time: 8/14/2023 4:00 PM    Performed by: Norbert Hayes MD  Authorized by: Norbert Hayes MD      REPORT OF EMG and NERVE CONDUCTION STUDY    Name: Loraine Guillen  Date of Study:  8/14/2023  Referring Physician:  Abigail  Test Performed by:  MD Abigail  Full Values Attached  Informed Consent Scanned.   No anesthesia used.   Amount of Blood Loss: none. The patient tolerated this procedure well.       Informed consent was obtained prior to performing this study. Two patient identifiers were confirmed with the patient prior to performing this study. A time out to determine correct patient and and agreement on procedure performed was conducted prior to the concentric needle examination.    Reason for the study:  numb hands      Findings:   Nerve conduction studies of the right  median (motor and sensory)nerves,right  bilateral ulnar (motor and sensory) nerves, right radial sensory nerve and left median sensory nerve  were performed.   There was poor patient tolerance. Median palm to wrist latency was prolonged to 3.2ms on the right. Overall and otherwise, Amplitudes, distal latencies, conduction velocities, and F-waves were normal.   This part of the exam was stopped due to poor patient tolerance.   EMG of selected muscles of the bilateral arms were attempted but due to poor patient tolerance, activation evaluations were limited and patient had to stop the procedure.       Impression:  Limited study due to poor patient tolerance. Suggestion of minimal right carpal tunnel syndrome is noted. No clear CTS on the left. The procedure was too limited to make any further diagnostic conclusions      Norbert Hayes M.D. [facility].     Recommendations (discussed at this visit):   Brace wearing helps somewhat  Due to her neck pain and degenerative changes by CT and lack of explanation for left hand numbness by the limited study  above: MRI C spine needed per orders  -ortho evaluation (she is already established) vs spine surgery or pain management consults may be needed pending that test  RTC given

## 2023-08-22 ENCOUNTER — HOSPITAL ENCOUNTER (OUTPATIENT)
Dept: RADIOLOGY | Facility: HOSPITAL | Age: 68
Discharge: HOME | End: 2023-08-22
Attending: PSYCHIATRY & NEUROLOGY
Payer: MEDICARE

## 2023-08-22 DIAGNOSIS — M54.12 CERVICAL RADICULAR PAIN: ICD-10-CM

## 2023-08-22 PROCEDURE — 72141 MRI NECK SPINE W/O DYE: CPT | Mod: TC

## 2023-08-24 ENCOUNTER — OFFICE VISIT (OUTPATIENT)
Dept: PAIN MEDICINE | Facility: CLINIC | Age: 68
End: 2023-08-24
Payer: MEDICARE

## 2023-08-24 VITALS
BODY MASS INDEX: 48.82 KG/M2 | HEART RATE: 64 BPM | WEIGHT: 293 LBS | HEIGHT: 65 IN | RESPIRATION RATE: 18 BRPM | DIASTOLIC BLOOD PRESSURE: 78 MMHG | SYSTOLIC BLOOD PRESSURE: 124 MMHG | OXYGEN SATURATION: 98 %

## 2023-08-24 DIAGNOSIS — M47.812 ARTHROPATHY OF CERVICAL FACET JOINT: ICD-10-CM

## 2023-08-24 DIAGNOSIS — M50.30 DDD (DEGENERATIVE DISC DISEASE), CERVICAL: Primary | ICD-10-CM

## 2023-08-24 DIAGNOSIS — M54.12 CERVICAL RADICULAR PAIN: ICD-10-CM

## 2023-08-24 PROCEDURE — 3078F PR MOST RECENT DIASTOLIC BLOOD PRESSURE < 80 MM HG: ICD-10-PCS | Mod: CPTII,S$GLB,, | Performed by: ANESTHESIOLOGY

## 2023-08-24 PROCEDURE — 99999 PR PBB SHADOW E&M-EST. PATIENT-LVL V: CPT | Mod: PBBFAC,,, | Performed by: ANESTHESIOLOGY

## 2023-08-24 PROCEDURE — 1125F AMNT PAIN NOTED PAIN PRSNT: CPT | Mod: CPTII,S$GLB,, | Performed by: ANESTHESIOLOGY

## 2023-08-24 PROCEDURE — 4010F PR ACE/ARB THEARPY RXD/TAKEN: ICD-10-PCS | Mod: CPTII,S$GLB,, | Performed by: ANESTHESIOLOGY

## 2023-08-24 PROCEDURE — 1159F MED LIST DOCD IN RCRD: CPT | Mod: CPTII,S$GLB,, | Performed by: ANESTHESIOLOGY

## 2023-08-24 PROCEDURE — 4010F ACE/ARB THERAPY RXD/TAKEN: CPT | Mod: CPTII,S$GLB,, | Performed by: ANESTHESIOLOGY

## 2023-08-24 PROCEDURE — 1125F PR PAIN SEVERITY QUANTIFIED, PAIN PRESENT: ICD-10-PCS | Mod: CPTII,S$GLB,, | Performed by: ANESTHESIOLOGY

## 2023-08-24 PROCEDURE — 3074F SYST BP LT 130 MM HG: CPT | Mod: CPTII,S$GLB,, | Performed by: ANESTHESIOLOGY

## 2023-08-24 PROCEDURE — 1160F RVW MEDS BY RX/DR IN RCRD: CPT | Mod: CPTII,S$GLB,, | Performed by: ANESTHESIOLOGY

## 2023-08-24 PROCEDURE — 3008F PR BODY MASS INDEX (BMI) DOCUMENTED: ICD-10-PCS | Mod: CPTII,S$GLB,, | Performed by: ANESTHESIOLOGY

## 2023-08-24 PROCEDURE — 99999 PR PBB SHADOW E&M-EST. PATIENT-LVL V: ICD-10-PCS | Mod: PBBFAC,,, | Performed by: ANESTHESIOLOGY

## 2023-08-24 PROCEDURE — 3074F PR MOST RECENT SYSTOLIC BLOOD PRESSURE < 130 MM HG: ICD-10-PCS | Mod: CPTII,S$GLB,, | Performed by: ANESTHESIOLOGY

## 2023-08-24 PROCEDURE — 99204 OFFICE O/P NEW MOD 45 MIN: CPT | Mod: S$GLB,,, | Performed by: ANESTHESIOLOGY

## 2023-08-24 PROCEDURE — 1159F PR MEDICATION LIST DOCUMENTED IN MEDICAL RECORD: ICD-10-PCS | Mod: CPTII,S$GLB,, | Performed by: ANESTHESIOLOGY

## 2023-08-24 PROCEDURE — 3008F BODY MASS INDEX DOCD: CPT | Mod: CPTII,S$GLB,, | Performed by: ANESTHESIOLOGY

## 2023-08-24 PROCEDURE — 3078F DIAST BP <80 MM HG: CPT | Mod: CPTII,S$GLB,, | Performed by: ANESTHESIOLOGY

## 2023-08-24 PROCEDURE — 99204 PR OFFICE/OUTPT VISIT, NEW, LEVL IV, 45-59 MIN: ICD-10-PCS | Mod: S$GLB,,, | Performed by: ANESTHESIOLOGY

## 2023-08-24 PROCEDURE — 1160F PR REVIEW ALL MEDS BY PRESCRIBER/CLIN PHARMACIST DOCUMENTED: ICD-10-PCS | Mod: CPTII,S$GLB,, | Performed by: ANESTHESIOLOGY

## 2023-08-24 RX ORDER — LIDOCAINE 50 MG/G
1 PATCH TOPICAL DAILY
Qty: 90 PATCH | Refills: 0 | Status: SHIPPED | OUTPATIENT
Start: 2023-08-24 | End: 2023-10-26

## 2023-08-24 RX ORDER — PREGABALIN 50 MG/1
50 CAPSULE ORAL 2 TIMES DAILY
Qty: 60 CAPSULE | Refills: 1 | Status: SHIPPED | OUTPATIENT
Start: 2023-08-24 | End: 2023-10-26

## 2023-08-24 NOTE — PROGRESS NOTES
[facility] NEW Patient Evaluation  Loraine Guillen  : 1955  Date: 2023     CHIEF COMPLAINT:  Neck Pain      Referring Physician Norbert Hayes MD    Primary Care Physician Rubén Jean MD    HPI:  This is a 68 y.o. female with a chief complaint of Neck Pain  . 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      Patient was seen and evaluated by Neurology team for tingling bilateral hand, patient had a recommendation for MRI cervical spine that showed multiple level disc osteophyte contacting the anterior cord with multiple level of mild-to-moderate bilateral neural foramina narrowing most pronounced at C3-C4 C4-C5, C5-C6 and C6-C7 with no abnormal signal within the cervical cord.  She sees rheumatologist for Raynaud last seen was in May 2023    Patient had EMG study 2023 suggested minimal right carpal tunnel syndrome, no clear on the left however the procedure was to limited to make any further diagnostic conclusion due to lack of patient tolerance.      Diabetic: Yes    Anticogualtion drugs: Plavix for mini stroke she has a rash from aspirin    Current Description of Pain Symptoms:  Pain started: 2023  Bilateral Occiptal hedache, Neck   Radiates to the Kody UEs intermittently, with numbness in BL Hand.   Associated symptoms:  Bilateral numbness in lower extremities mainly lateral aspect of the thigh  Pain is Getting worse over the last 3 months    The pain is described as numbing.   Exacerbating factors: Turning side to side  Mitigating factors heat and medications.   Symptoms interfere with daily activity.   The patient feels like symptoms have been worsening.    Patient denies night fever/night sweats, urinary incontinence, bowel incontinence, significant weight loss, significant motor weakness, and loss of sensations.    Pain score:   Current: Pain is 5    Current pain medications:        acetaminophen (TYLENOL) 650 MG PRN    Current Opioid Pain Medication:  Opioids- None    UDS  NA    PDMP    Reviewed and consistent with medication use as prescribed.     Pain Treatment History:    Physical Therapy/HEP/Physician Lead exercise program :  NA    Non-interventional Pain Therapy:  Chiropractor:  Acupuncture:  TENS unit:  Heat/ICE:  Back Brace:    Medications previously tried:  NSAIDs: None  Topical Agent: yes  TCA/SSRI/SNRI: None  Anti-convulsants: Gabapentin  and Lyrica  SE increase hear moeller   Muscle Relaxants: None  Opioids- None  Benzodiazepines: No    Interventional Pain Procedures:   NA    Previous spine/joint surgery:   No    Surgical History:   has a past surgical history that includes Tubal ligation;  Gallstones removed; Cardiac catheterization; Hysterectomy (09/22/14); Oophorectomy (09/22/14); and Colonoscopy (N/A, 8/17/2018).    Medical History     has a past medical history of Anemia, Arthritis, CAD (coronary artery disease), Cataract, CVA (cerebral infarction), Encounter for screening colonoscopy (8/18/2014), Fever blister, GERD (gastroesophageal reflux disease), Glaucoma, Glaucoma suspect of both eyes, H/O amaurosis fugax, Hyperlipemia, Hypertension, Joint pain, and Sleep apnea.    Family History:  family history includes Breast cancer in her maternal aunt; Diabetes in her brother; Heart attack in her brother; Heart disease in her brother and mother; Hypertension in her brother, father, mother, and sister; Obesity in her sister; Stroke in her father.    Allergies:  Aspirin, Clindamycin, Metoprolol tartrate, Ranexa [ranolazine], Strawberry, and Tramadol     Medications  Current Outpatient Medications   Medication Sig    acetaminophen (TYLENOL) 650 MG TbSR Take  650 mg by mouth every 8 (eight) hours.    albuterol (PROAIR HFA) 90 mcg/actuation inhaler INHALE 2 PUFFS INTO THE LUNGS 4 (FOUR) TIMES DAILY AS NEEDED.    alendronate (FOSAMAX) 10 MG Tab Take 1 tablet (10 mg total) by mouth once daily.    alirocumab (PRALUENT PEN) 150 mg/mL PnIj Inject 2 mL (300 mg total) into the skin as instructed (once a month).    ammonium lactate (LAC-HYDRIN) 12 % lotion Apply topically 3 (three) times daily. Apply to whole body    ascorbic acid, vitamin C, (VITAMIN C) 1000 MG tablet Take 3,000 mg by mouth once daily.    atorvastatin (LIPITOR) 80 MG tablet Take 1 tablet (80 mg total) by mouth every evening.    calcium carbonate (OS-KODY) 500 mg calcium (1,250 mg) tablet Take 1 tablet (500 mg total) by mouth once daily.    carvediloL (COREG) 12.5 MG tablet TAKE 1 TABLET BY MOUTH TWICE A DAY WITH MEALS    cholecalciferol, vitamin D3, (VITAMIN D3) 25 mcg (1,000 unit) capsule Take 3,000 Units by mouth once daily.    clobetasoL (TEMOVATE) 0.05 % cream Apply topically 2 (two) times daily. Apply small amount to left leg only .    clopidogreL (PLAVIX) 75 mg tablet TAKE 1 TABLET BY MOUTH EVERY DAY    cyanocobalamin, vitamin B-12, 50 mcg tablet Take 50 mcg by mouth every 30 days.    docusate sodium (COLACE) 100 MG capsule Take 1 capsule (100 mg total) by mouth 2 (two) times daily. (Patient taking differently: Take 100 mg by mouth 2 (two) times daily as needed.)    ezetimibe (ZETIA) 10 mg tablet TAKE 1 TABLET BY MOUTH EVERY DAY    famotidine (PEPCID) 40 MG tablet Take 1 tablet (40 mg total) by mouth once daily.    fluticasone furoate-vilanteroL (BREO ELLIPTA) 200-25 mcg/dose DsDv diskus inhaler Inhale 1 puff into the lungs once daily. Controller    furosemide (LASIX) 20 MG tablet TAKE 1 TABLET BY MOUTH DAILY AS NEEDED FOR FLUID OVERLOAD    hydrocortisone 2.5 % cream Apply to dark patch on leg bid .    irbesartan (AVAPRO) 150 MG tablet TAKE 1 TABLET BY MOUTH EVERY EVENING.    isosorbide mononitrate (IMDUR)  "30 MG 24 hr tablet TAKE 1 TABLET BY MOUTH EVERY DAY    ketoconazole (NIZORAL) 2 % cream Have patient mix ketoconazole cream, triamcinolone cream and milk of magnesia in  1:1:1 ratio.  Patient has sterile jar .  Patient to apply mixture to rash bid prn. (Patient taking differently: Have patient mix ketoconazole cream, triamcinolone cream and milk of magnesia in  1:1:1 ratio.  Patient has sterile jar .  Patient to apply mixture to rash bid prn.)    latanoprost (XALATAN) 0.005 % ophthalmic solution Place 1 drop into both eyes once daily.    montelukast (SINGULAIR) 10 mg tablet Take 1 tablet (10 mg total) by mouth nightly.    mupirocin (BACTROBAN) 2 % ointment Apply topically 3 (three) times daily. Apply to incest bites as needed.    mv-min-folic-calcium carb-K1 400 mcg-500 mg calcium-20 mcg Tab Take 1 tablet by mouth once daily.    nitroGLYCERIN (NITROSTAT) 0.4 MG SL tablet Place 1 tablet (0.4 mg total) under the tongue every 5 (five) minutes as needed for Chest pain. PLACE 1 TABLET (0.4 MG TOTAL) UNDER THE TONGUE EVERY 5 (FIVE) MINUTES AS NEEDED FOR CHEST PAIN.    RESTASIS 0.05 % ophthalmic emulsion Place 1 drop into both eyes 2 (two) times daily.    solifenacin (VESICARE) 5 MG tablet Take 1 tablet (5 mg total) by mouth once daily.    vitamin E 100 UNIT capsule Take 100 Units by mouth once daily. Pt states she takes "2000" units daily    LIDOcaine (LIDODERM) 5 % Place 1 patch onto the skin once daily. Remove & Discard patch within 12 hours or as directed by MD    pregabalin (LYRICA) 50 MG capsule Take 1 capsule (50 mg total) by mouth 2 (two) times daily.     Current Facility-Administered Medications   Medication    triamcinolone acetonide injection 40 mg      Social History/SUBSTANCE ABUSE HISTORY:  Personal history of substance abuse: No     reports that she has never smoked. She has never used smokeless tobacco. She reports current alcohol use. She reports that she does not use drugs.    LABS:  CBC  Lab Results "   Component Value Date    WBC 3.25 (L) 07/31/2023    HGB 11.8 (L) 07/31/2023    HCT 34.6 (L) 07/31/2023    MCV 79 (L) 07/31/2023     07/31/2023       CMP  Sodium   Date Value Ref Range Status   07/31/2023 141 136 - 145 mmol/L Final     Potassium   Date Value Ref Range Status   07/31/2023 4.3 3.5 - 5.1 mmol/L Final     Chloride   Date Value Ref Range Status   07/31/2023 107 95 - 110 mmol/L Final     CO2   Date Value Ref Range Status   07/31/2023 25 23 - 29 mmol/L Final     Glucose   Date Value Ref Range Status   07/31/2023 78 70 - 110 mg/dL Final     BUN   Date Value Ref Range Status   07/31/2023 18 8 - 23 mg/dL Final     Creatinine   Date Value Ref Range Status   07/31/2023 0.7 0.5 - 1.4 mg/dL Final     Calcium   Date Value Ref Range Status   07/31/2023 8.8 8.7 - 10.5 mg/dL Final     Total Protein   Date Value Ref Range Status   07/31/2023 6.6 6.0 - 8.4 g/dL Final     Albumin   Date Value Ref Range Status   07/31/2023 3.7 3.5 - 5.2 g/dL Final     Total Bilirubin   Date Value Ref Range Status   07/31/2023 0.7 0.1 - 1.0 mg/dL Final     Comment:     For infants and newborns, interpretation of results should be based  on gestational age, weight and in agreement with clinical  observations.    Premature Infant recommended reference ranges:  Up to 24 hours.............<8.0 mg/dL  Up to 48 hours............<12.0 mg/dL  3-5 days..................<15.0 mg/dL  6-29 days.................<15.0 mg/dL       Alkaline Phosphatase   Date Value Ref Range Status   07/31/2023 41 (L) 55 - 135 U/L Final     AST   Date Value Ref Range Status   07/31/2023 17 10 - 40 U/L Final     ALT   Date Value Ref Range Status   07/31/2023 13 10 - 44 U/L Final     Anion Gap   Date Value Ref Range Status   07/31/2023 9 8 - 16 mmol/L Final     eGFR if    Date Value Ref Range Status   06/21/2022 >60.0 >60 mL/min/1.73 m^2 Final     eGFR if non    Date Value Ref Range Status   06/21/2022 >60.0 >60 mL/min/1.73 m^2 Final  "    Comment:     Calculation used to obtain the estimated glomerular filtration  rate (eGFR) is the CKD-EPI equation.          HGBA1C  Lab Results   Component Value Date    HGBA1C 5.3 10/20/2020       ROS:    Review of Systems   Musculoskeletal:  Positive for neck pain and neck stiffness.   Neurological:  Positive for numbness and headaches.        GENERAL:  No weight loss, malaise or fevers.  HEENT:   No recent changes in vision or hearing  NECK:  Negative for lumps, no difficulty with swallowing.  RESPIRATORY:  Negative for cough, wheezing or shortness of breath, patient denies any recent URI.  CARDIOVASCULAR:  Negative for chest pain, leg swelling or palpitations.  GI:  Negative for abdominal discomfort, blood in stools or black stools or change in bowel habits.  MUSCULOSKELETAL:  See HPI.  SKIN:  Negative for lesions, rash, and itching.  PSYCH:  No mood disorder or recent psychosocial stressors.   HEMATOLOGY/LYMPHOLOGY:  Positive for prolonged bleeding, bruising easily or swollen nodes.  Patient is n currently taking anti-coagulants  NEURO:  See HPI  All other reviewed and negative other than HPI.    PHYSICAL EXAM:    VITALS: /78   Pulse 64   Resp 18   Ht 5' 5" (1.651 m)   Wt (!) 138 kg (304 lb 4.8 oz)   LMP  (LMP Unknown)   SpO2 98%   BMI 50.64 kg/m²   Body mass index is 50.64 kg/m².    GENERAL: Well appearing, in no acute distress, alert and oriented x3, answers questions appropriately.   PSYCH: Flat affect.    SKIN: Skin color, texture, turgor normal, no rashes or lesions.  HEAD/FACE:  Normocephalic, atraumatic. Cranial nerves grossly intact.  CV: Regular rate  PULM: No evidence of respiratory difficulty, symmetric chest rise.  GI:  Soft and non-Distended.    NECK: (+) pain to palpation over the cervical paraspinous muscles. Spurling: +. (+) pain with neck flexion, extension, or lateral flexion, Muscle strength in RT UE 55/5 and Left UE 5/5, Hand  5/5, Mary test is negative " bilaterally.    Hip Exam:      Inspection: No gross deformity or apparent leg length discrepancy      Palpation:  No TTP to bilateral greater trochanteric bursas.       ROM:  No limitation or pain in internal rotation, external rotation b/l  Neurologic Exam:     Alert. Speech is fluent and appropriate.     Strength: 5/5 in right hip flexion and knee extension, otherwise 5/5 throughout bilateral lower extremities     Sensation:  Grossly intact to light touch in bilateral lower extremities     Tone: No abnormality appreciated in bilateral lower extremities     No Clonus    GAIT:  Stable    DIAGNOSTIC STUDIES AND MEDICAL RECORDS REVIEW:        I have personally reviewed and interpreted relevant radiology reports and reviewed relevant records from other services in the EMR.      EMG 08/2023     Impression:  Limited study due to poor patient tolerance. Suggestion of minimal right carpal tunnel syndrome is noted. No clear CTS on the left. The procedure was too limited to make any further diagnostic conclusions         MRI CERVICAL SPINE WITHOUT CONTRAST August 2023     CLINICAL HISTORY:  Radiculopathy, cervical regionMRI CERVICAL SPINE WITHOUT CONTRAST     TECHNIQUE:  MRI of the cervical spine was performed in multiple planes and sequences.     COMPARISON:  CT C-spine 08/17/2022.     FINDINGS:  Reversal the normal lordosis.  Mild multilevel disc desiccation and loss of disc height, most pronounced C3-4.     C2-3: Broad-based disc-osteophyte with contact with the anterior cord.     C3-4: Broad-based disc-osteophyte with contact with the anterior cord.  Uncinate hypertrophy with moderate bilateral foraminal stenosis.     C4-5: Broad-based disc-osteophyte with contact with the anterior cord.  Uncinate hypertrophy with mild bilateral foraminal stenosis.     C5-6: No significant disc bulge.  Uncinate hypertrophy with mild bilateral foraminal stenosis.     C6-7: No significant disc bulge.  Uncinate hypertrophy with mild left  foraminal stenosis.     C7-T1: No significant disc bulge.     No abnormal signal within the cervical cord.  No abnormality seen within the visualized portions of the posterior fossa.  No vertebral body marrow edema noted.     Impression:     Disc-osteophyte C2-3, C3-4, and C4-5 with contact with the anterior cord.    ASSESSMENT AND PLAN:  Loraine Guillen is a 68 y.o. female with the following diagnoses based on history, exam, and imaging:    Problem List Items Addressed This Visit    None  Visit Diagnoses       DDD (degenerative disc disease), cervical    -  Primary    Relevant Medications    LIDOcaine (LIDODERM) 5 %    pregabalin (LYRICA) 50 MG capsule    Other Relevant Orders    Ambulatory referral/consult to Physical/Occupational Therapy    Arthropathy of cervical facet joint                 This is a pleasant 68 y.o. lady presenting with neck pain, bilateral occipital headache, bilateral hand numbness started around January 2023 that has been worsening over the last 2 or 3 months, pain is worse with turning side-to-side, has not done any physical therapy, she was taken Tylenol and stopped it, she tried gabapentin before and stopped it due to concern for weight gain, on physical examination she has tenderness point over the cervical paraspinal muscle bilaterally, negative Mary's sign, positive Spurling's test, handgrip is 5/5.    Patient has cervical facet arthropathy in addition to some element with carpal tunnel syndrome, I do not believe that she have cervical radiculopathy based on the MRI results with the cervical spine on August 2023, EMG/nerve conduction study was inconclusive    We discussed the underlying diagnoses and multiple treatment options including non-opioid medications, interventional procedures, physical therapy, home exercise, core muscle enhancement, and weight loss.  The risks and benefits of each treatment option were discussed and all questions were answered.      Treatment Plan    1-Diagnostics/Referrals:  PT for neck pain     2-Medications:  NSAIDs: None  Topical Agent:  Start compound cream, lidocaine patch 5%  TCA/SSRI/SNRI: None  Anti-convulsants: Lyrica started her on Lyrica 50 mg twice a day, patient was asked to clear from Cardiology if she can able to take it or not  Muscle Relaxants: None  Opioids- None    3-Interventional Therapy: NA    4-Physical Rehabilitation: Referral to Physical therapy for Cervical ROM, stretching, strengthening, and a home exercise program.    5-Patient Education Counseled patient regarding the importance of activity modification and physical therapy    6-Follow-up: RTC 2 months    May consider:  Cervical facet injection, increasing the Lyrica, considering Cymbalta, considering what muscle relaxer    I would like to thank Norbert Hayes MD for the opportunity to assist in the care of this patient. We had a very nice visit and I look forward to continuing their care. Please let me know if I can be of further assistance.     Violetta Mcgraw MD  Interventional Pain Medicine  08/24/2023    Disclaimer:  This note was prepared using voice recognition system and is likely to have sound alike errors that may have been overlooked even after proof reading.  Please call me with any questions.

## 2023-08-28 ENCOUNTER — TELEPHONE (OUTPATIENT)
Dept: PAIN MEDICINE | Facility: CLINIC | Age: 68
End: 2023-08-28
Payer: MEDICARE

## 2023-10-02 ENCOUNTER — CLINICAL SUPPORT (OUTPATIENT)
Dept: REHABILITATION | Facility: HOSPITAL | Age: 68
End: 2023-10-02
Attending: ANESTHESIOLOGY
Payer: MEDICARE

## 2023-10-02 DIAGNOSIS — M53.82 DECREASED RANGE OF MOTION OF INTERVERTEBRAL DISCS OF CERVICAL SPINE: ICD-10-CM

## 2023-10-02 DIAGNOSIS — M50.30 DDD (DEGENERATIVE DISC DISEASE), CERVICAL: ICD-10-CM

## 2023-10-02 DIAGNOSIS — R29.898 DECREASED STRENGTH OF UPPER EXTREMITY: ICD-10-CM

## 2023-10-02 PROCEDURE — 97162 PT EVAL MOD COMPLEX 30 MIN: CPT | Mod: PN

## 2023-10-02 PROCEDURE — 97530 THERAPEUTIC ACTIVITIES: CPT | Mod: PN

## 2023-10-18 ENCOUNTER — CLINICAL SUPPORT (OUTPATIENT)
Dept: REHABILITATION | Facility: HOSPITAL | Age: 68
End: 2023-10-18
Attending: ANESTHESIOLOGY
Payer: MEDICARE

## 2023-10-18 DIAGNOSIS — R29.898 DECREASED STRENGTH OF UPPER EXTREMITY: ICD-10-CM

## 2023-10-18 DIAGNOSIS — M50.30 DDD (DEGENERATIVE DISC DISEASE), CERVICAL: Primary | ICD-10-CM

## 2023-10-18 DIAGNOSIS — M53.82 DECREASED RANGE OF MOTION OF INTERVERTEBRAL DISCS OF CERVICAL SPINE: ICD-10-CM

## 2023-10-18 PROCEDURE — 97110 THERAPEUTIC EXERCISES: CPT | Mod: PN,CQ

## 2023-10-18 NOTE — PROGRESS NOTES
[facility]   Physical Therapy Treatment Note      Name: Loraine Guillen  Clinic Number: [id number]    Therapy Diagnosis:   Encounter Diagnoses   Name Primary?    DDD (degenerative disc disease), cervical Yes    Decreased range of motion of intervertebral discs of cervical spine     Decreased strength of upper extremity      Physician: Violetta Mcgraw MD    Visit Date: 10/18/2023    Physician: Violetta Mcgraw MD     Physician Orders: PT Eval and Treat   Medical Diagnosis from Referral: M50.30 (ICD-10-CM) - DDD (degenerative disc disease), cervical   Evaluation Date: 10/2/2023  Authorization Period Expiration: 11/23/2023  Plan of Care Expiration: 12/31/2023  Progress Note Due: VISIT 4   Visit # / Visits authorized: 2/ 23   FOTO: 1/3     Precautions: Standard      Time In: 1430  Time Out:1515  Total Appointment Time (timed & untimed codes):  45 minutes    PTA Visit #: 1/5       Subjective     Patient reports: Patient reports that she has neck pain today.   She was compliant with home exercise program.  Response to previous treatment: 1st treatment  Functional change: 1st treatment    Pain: 5/10  Location: bilateral neck      Objective      Objective Measures updated at progress report unless specified.     Treatment     Loraine received the treatments listed below:      therapeutic exercises to develop strength, ROM, flexibility, posture, and core stabilization for 45 minutes including:   Review of HEP  Cervical extension with towel 3x10 sec hold   Cervical rotation stretch 3x10 sec hold bilaterally   Seated ER with red thera band 2x10 reps  Seated scapular retractions 2x10 reps    manual therapy techniques: Soft tissue Mobilization were applied to the: : to neck, upper traps  for 00 minutes, including: not addressed      neuromuscular re-education activities to improve: Balance, Coordination, and Posture for 00 minutes. The following activities were included: Not addressed       therapeutic  activities to improve functional performance for 00  minutes, including:not addressed today      gait training to improve functional mobility and safety for 00  minutes, including: not addressed today      direct contact modalities after being cleared for contraindications:     supervised modalities after being cleared for contradictions:     hot pack for 00 minutes to Not addressed .    cold pack for 00 minutes to Not addressed .    Patient Education and Home Exercises       Education provided:   - review  of HEP    Written Home Exercises Provided: yes. Exercises were reviewed and Loraine was able to demonstrate them prior to the end of the session.  Loraine demonstrated good  understanding of the education provided. See Electronic Medical Record under Patient Instructions for exercises provided during therapy sessions    Assessment     Patient had good tolerance for PT session with review of HEP. Added scapular retractions to HEP.Patient required tactile and verbal cues for correct technique and improving muscle recruitment.     Loraine Is progressing well towards her goals.   Patient prognosis is Good.     Patient will continue to benefit from skilled outpatient physical therapy to address the deficits listed in the problem list box on initial evaluation, provide pt/family education and to maximize pt's level of independence in the home and community environment.     Patient's spiritual, cultural and educational needs considered and pt agreeable to plan of care and goals.     Anticipated barriers to physical therapy: pain amplification, chronic nature    Goals:   Short Term Goals: 4 weeks   1. Patient demonstrates independence with HEP.   2. Patient demonstrates independence with Postural Awareness while perform seated activities such as fátima/knitting    3. Patient demonstrates independence with body mechanics during sitting   4. Patient will improve cervical range of motion by 5 degrees in restricted range of  motion.      Long Term Goals: 8 weeks   1. Patient demonstrates increased cervical range of motion to improve tolerance to functional activities.   2. Patient demonstrates increased strength BUE's to 4/5 or greater to improve tolerance to functional activities.   3. Patient demonstrates improved overall function per FOTO to 39% or less.   4. Patient will report pain 2/10 during scanning activities.       Plan     Plan of care Certification: 10/2/2023 to 12/31/2023.     Outpatient Physical Therapy 2 times weekly for 8 weeks to include the following interventions: Cervical/Lumbar Traction, Electrical Stimulation  , Gait Training, Manual Therapy, Moist Heat/ Ice, Neuromuscular Re-ed, Orthotic Management and Training, Patient Education, Self Care, Therapeutic Activities, and Therapeutic Exercise.        Renae Goodell, PTA

## 2023-10-23 ENCOUNTER — CLINICAL SUPPORT (OUTPATIENT)
Dept: REHABILITATION | Facility: HOSPITAL | Age: 68
End: 2023-10-23
Attending: ANESTHESIOLOGY
Payer: MEDICARE

## 2023-10-23 DIAGNOSIS — R29.898 DECREASED STRENGTH OF UPPER EXTREMITY: ICD-10-CM

## 2023-10-23 DIAGNOSIS — M53.82 DECREASED RANGE OF MOTION OF INTERVERTEBRAL DISCS OF CERVICAL SPINE: ICD-10-CM

## 2023-10-23 DIAGNOSIS — M50.30 DDD (DEGENERATIVE DISC DISEASE), CERVICAL: Primary | ICD-10-CM

## 2023-10-23 PROCEDURE — 97140 MANUAL THERAPY 1/> REGIONS: CPT | Mod: PN,CQ

## 2023-10-23 PROCEDURE — 97110 THERAPEUTIC EXERCISES: CPT | Mod: PN,CQ

## 2023-10-23 NOTE — PROGRESS NOTES
[facility]   Physical Therapy Treatment Note      Name: Loraine Guillen  Clinic Number: [id number]    Therapy Diagnosis:   Encounter Diagnoses   Name Primary?    DDD (degenerative disc disease), cervical Yes    Decreased range of motion of intervertebral discs of cervical spine     Decreased strength of upper extremity        Physician: Violetta Mcgraw MD    Visit Date: 10/23/2023    Physician: Violetta Mcgraw MD     Physician Orders: PT Eval and Treat   Medical Diagnosis from Referral: M50.30 (ICD-10-CM) - DDD (degenerative disc disease), cervical   Evaluation Date: 10/2/2023  Authorization Period Expiration: 11/23/2023  Plan of Care Expiration: 12/31/2023  Progress Note Due: VISIT 4   Visit # / Visits authorized: 2/ 23   FOTO: 1/3     Precautions: Standard      Time In: 1430  Time Out:1510  Total Appointment Time (timed & untimed codes):  40 minutes    PTA Visit #: 2/5       Subjective     Patient reports: Patient states that her neck pain has improved. Patient states that   She was compliant with home exercise progr  am.  Response to previous treatment:  Functional change:    Pain: 4/10  Location: bilateral neck      Objective      Objective Measures updated at progress report unless specified.     Treatment     Loraine received the treatments listed below:      therapeutic exercises to develop strength, ROM, flexibility, posture, and core stabilization for 30 minutes including:   Review of HEP  UBE 2 minutes forward, 2 minutes backward L1  Cervical extension with towel 3x10 sec hold   Cervical rotation stretch 5x5 sec hold bilaterally   Standing ER with red thera band 2x10 reps  Standing scapular rows 2x10 reps with red band  Pelvic tilt with alt leg lift off mat x10 reps    manual therapy techniques:  Gentle Soft tissue Mobilization were applied to the 10 minutes  to neck, upper traps including: for pain relief.     neuromuscular re-education activities to improve: Balance,  Coordination, and Posture for 00 minutes. The following activities were included: Not addressed       therapeutic activities to improve functional performance for 00  minutes, including:not addressed today      gait training to improve functional mobility and safety for 00  minutes, including: not addressed today      direct contact modalities after being cleared for contraindications:     supervised modalities after being cleared for contradictions:     hot pack for 00 minutes to Not addressed .    cold pack for 00 minutes to Not addressed .    Patient Education and Home Exercises       Education provided:   - review  of HEP, use of heat on neck for up to 20 minutes for pain relief 3-4 times a day.     Written Home Exercises Provided: yes. Exercises were reviewed and Loraine was able to demonstrate them prior to the end of the session.  Loraine demonstrated good  understanding of the education provided. See Electronic Medical Record under Patient Instructions for exercises provided during therapy sessions    Assessment     Patient had fair tolerance for soft tissue mobilization due to pain in upper traps R greater than L. Patient pain increased after soft tissue mobilization in upper traps to a 7/10 after PT session. Patient demonstrating decreased ROM in neck with cervical rotation bilaterally.     Loraine Is progressing well towards her goals.   Patient prognosis is Good.     Patient will continue to benefit from skilled outpatient physical therapy to address the deficits listed in the problem list box on initial evaluation, provide pt/family education and to maximize pt's level of independence in the home and community environment.     Patient's spiritual, cultural and educational needs considered and pt agreeable to plan of care and goals.     Anticipated barriers to physical therapy: pain amplification, chronic nature    Goals:   Short Term Goals: 4 weeks   1. Patient demonstrates independence with HEP.   2. Patient  demonstrates independence with Postural Awareness while perform seated activities such as fátima/knitting    3. Patient demonstrates independence with body mechanics during sitting   4. Patient will improve cervical range of motion by 5 degrees in restricted range of motion.      Long Term Goals: 8 weeks   1. Patient demonstrates increased cervical range of motion to improve tolerance to functional activities.   2. Patient demonstrates increased strength BUE's to 4/5 or greater to improve tolerance to functional activities.   3. Patient demonstrates improved overall function per FOTO to 39% or less.   4. Patient will report pain 2/10 during scanning activities.       Plan     Plan of care Certification: 10/2/2023 to 12/31/2023.     Outpatient Physical Therapy 2 times weekly for 8 weeks to include the following interventions: Cervical/Lumbar Traction, Electrical Stimulation  , Gait Training, Manual Therapy, Moist Heat/ Ice, Neuromuscular Re-ed, Orthotic Management and Training, Patient Education, Self Care, Therapeutic Activities, and Therapeutic Exercise.        Renae Goodell, PTA

## 2023-10-25 ENCOUNTER — CLINICAL SUPPORT (OUTPATIENT)
Dept: REHABILITATION | Facility: HOSPITAL | Age: 68
End: 2023-10-25
Attending: ANESTHESIOLOGY
Payer: MEDICARE

## 2023-10-25 DIAGNOSIS — M53.82 DECREASED RANGE OF MOTION OF INTERVERTEBRAL DISCS OF CERVICAL SPINE: ICD-10-CM

## 2023-10-25 DIAGNOSIS — R29.898 DECREASED STRENGTH OF UPPER EXTREMITY: ICD-10-CM

## 2023-10-25 DIAGNOSIS — M50.30 DDD (DEGENERATIVE DISC DISEASE), CERVICAL: Primary | ICD-10-CM

## 2023-10-25 PROCEDURE — 97110 THERAPEUTIC EXERCISES: CPT | Mod: PN,CQ

## 2023-10-25 PROCEDURE — 97140 MANUAL THERAPY 1/> REGIONS: CPT | Mod: PN,CQ

## 2023-10-25 NOTE — PROGRESS NOTES
[facility]   Physical Therapy Treatment Note      Name: Loraine Guillen  Clinic Number: [id number]    Therapy Diagnosis:   Encounter Diagnoses   Name Primary?    DDD (degenerative disc disease), cervical Yes    Decreased range of motion of intervertebral discs of cervical spine     Decreased strength of upper extremity        Physician: Violetta Mcgraw MD    Visit Date: 10/25/2023    Physician: Violetta Mcgraw MD     Physician Orders: PT Eval and Treat   Medical Diagnosis from Referral: M50.30 (ICD-10-CM) - DDD (degenerative disc disease), cervical   Evaluation Date: 10/2/2023  Authorization Period Expiration: 11/23/2023  Plan of Care Expiration: 12/31/2023  Progress Note Due: VISIT 4   Visit # / Visits authorized: 2/ 23   FOTO: 1/3     Precautions: Standard      Time In: 1030  Time Out:1110  Total Appointment Time (timed & untimed codes):  40 minutes    PTA Visit #: 3/5       Subjective     Patient reports: having a sharp pain today on L side today. Patient states turning her head and felt a sharp pain.   She was compliant with home exercise program  am.  Response to previous treatment:  Functional change:    Pain: 7-8/10  Location: L side of neck    Objective      Objective Measures updated at progress report unless specified.     Treatment     Loraine received the treatments listed below:      therapeutic exercises to develop strength, ROM, flexibility, posture, and core stabilization for 30 minutes including:   Review of HEP  Cervical extension with towel 3x10 sec hold   Cervical rotation stretch 5x5 sec hold bilaterally   Standing ER with red thera band 2x10 reps  Standing scapular rows 2x10 reps with red band  Supine shoulder flexion 1# bar 2x10  with head head of mat elevated  Supine scapular pinches  head of mat elevated 2x10 2 sec hold    Not addressed Today:   UBE 2 minutes forward, 2 minutes backward L1    manual therapy techniques:  Gentle Soft tissue Mobilization and PROM R  and L rotation were applied to the 10 minutes  to neck, upper traps including: for pain relief.     neuromuscular re-education activities to improve: Balance, Coordination, and Posture for 00 minutes. The following activities were included: Not addressed     therapeutic activities to improve functional performance for 00  minutes, including:not addressed today    gait training to improve functional mobility and safety for 00  minutes, including: not addressed today    direct contact modalities after being cleared for contraindications:     supervised modalities after being cleared for contradictions:     hot pack for 10 minutes to posterior neck for pain relief at the end of PT session.     cold pack for 00 minutes to Not addressed .    Patient Education and Home Exercises       Education provided:   - review  of HEP, use of heat on neck for up to 10 to 15  minutes for pain relief 3-4 times a day.     Written Home Exercises Provided: yes. Exercises were reviewed and Loraine was able to demonstrate them prior to the end of the session.  Loraine demonstrated good  understanding of the education provided. See Electronic Medical Record under Patient Instructions for exercises provided during therapy sessions    Assessment     Patient had fair tolerance for soft tissue mobilization  and gentle ROM to the L due to pain  with L side rotation in supine position. Mild relief after STM and gentle PROM.   Loraine Is progressing well towards her goals.   Patient prognosis is Good.     Patient will continue to benefit from skilled outpatient physical therapy to address the deficits listed in the problem list box on initial evaluation, provide pt/family education and to maximize pt's level of independence in the home and community environment.     Patient's spiritual, cultural and educational needs considered and pt agreeable to plan of care and goals.     Anticipated barriers to physical therapy: pain amplification, chronic  nature    Goals:   Short Term Goals: 4 weeks   1. Patient demonstrates independence with HEP.   2. Patient demonstrates independence with Postural Awareness while perform seated activities such as fátima/knitting    3. Patient demonstrates independence with body mechanics during sitting   4. Patient will improve cervical range of motion by 5 degrees in restricted range of motion.      Long Term Goals: 8 weeks   1. Patient demonstrates increased cervical range of motion to improve tolerance to functional activities.   2. Patient demonstrates increased strength BUE's to 4/5 or greater to improve tolerance to functional activities.   3. Patient demonstrates improved overall function per FOTO to 39% or less.   4. Patient will report pain 2/10 during scanning activities.       Plan     Plan of care Certification: 10/2/2023 to 12/31/2023.     Outpatient Physical Therapy 2 times weekly for 8 weeks to include the following interventions: Cervical/Lumbar Traction, Electrical Stimulation  , Gait Training, Manual Therapy, Moist Heat/ Ice, Neuromuscular Re-ed, Orthotic Management and Training, Patient Education, Self Care, Therapeutic Activities, and Therapeutic Exercise.        Renae Goodell, PTA

## 2023-10-26 ENCOUNTER — OFFICE VISIT (OUTPATIENT)
Dept: PAIN MEDICINE | Facility: CLINIC | Age: 68
End: 2023-10-26
Payer: MEDICARE

## 2023-10-26 VITALS
SYSTOLIC BLOOD PRESSURE: 136 MMHG | HEIGHT: 65 IN | BODY MASS INDEX: 48.82 KG/M2 | HEART RATE: 66 BPM | RESPIRATION RATE: 18 BRPM | OXYGEN SATURATION: 100 % | DIASTOLIC BLOOD PRESSURE: 88 MMHG | WEIGHT: 293 LBS

## 2023-10-26 DIAGNOSIS — M47.812 ARTHROPATHY OF CERVICAL FACET JOINT: ICD-10-CM

## 2023-10-26 DIAGNOSIS — M50.30 DDD (DEGENERATIVE DISC DISEASE), CERVICAL: Primary | ICD-10-CM

## 2023-10-26 PROCEDURE — 3075F SYST BP GE 130 - 139MM HG: CPT | Mod: CPTII,S$GLB,, | Performed by: ANESTHESIOLOGY

## 2023-10-26 PROCEDURE — 1101F PT FALLS ASSESS-DOCD LE1/YR: CPT | Mod: CPTII,S$GLB,, | Performed by: ANESTHESIOLOGY

## 2023-10-26 PROCEDURE — 3288F FALL RISK ASSESSMENT DOCD: CPT | Mod: CPTII,S$GLB,, | Performed by: ANESTHESIOLOGY

## 2023-10-26 PROCEDURE — 3288F PR FALLS RISK ASSESSMENT DOCUMENTED: ICD-10-PCS | Mod: CPTII,S$GLB,, | Performed by: ANESTHESIOLOGY

## 2023-10-26 PROCEDURE — 1125F AMNT PAIN NOTED PAIN PRSNT: CPT | Mod: CPTII,S$GLB,, | Performed by: ANESTHESIOLOGY

## 2023-10-26 PROCEDURE — 1125F PR PAIN SEVERITY QUANTIFIED, PAIN PRESENT: ICD-10-PCS | Mod: CPTII,S$GLB,, | Performed by: ANESTHESIOLOGY

## 2023-10-26 PROCEDURE — 3075F PR MOST RECENT SYSTOLIC BLOOD PRESS GE 130-139MM HG: ICD-10-PCS | Mod: CPTII,S$GLB,, | Performed by: ANESTHESIOLOGY

## 2023-10-26 PROCEDURE — 1101F PR PT FALLS ASSESS DOC 0-1 FALLS W/OUT INJ PAST YR: ICD-10-PCS | Mod: CPTII,S$GLB,, | Performed by: ANESTHESIOLOGY

## 2023-10-26 PROCEDURE — 1159F MED LIST DOCD IN RCRD: CPT | Mod: CPTII,S$GLB,, | Performed by: ANESTHESIOLOGY

## 2023-10-26 PROCEDURE — 1160F RVW MEDS BY RX/DR IN RCRD: CPT | Mod: CPTII,S$GLB,, | Performed by: ANESTHESIOLOGY

## 2023-10-26 PROCEDURE — 1159F PR MEDICATION LIST DOCUMENTED IN MEDICAL RECORD: ICD-10-PCS | Mod: CPTII,S$GLB,, | Performed by: ANESTHESIOLOGY

## 2023-10-26 PROCEDURE — 4010F PR ACE/ARB THEARPY RXD/TAKEN: ICD-10-PCS | Mod: CPTII,S$GLB,, | Performed by: ANESTHESIOLOGY

## 2023-10-26 PROCEDURE — 3079F DIAST BP 80-89 MM HG: CPT | Mod: CPTII,S$GLB,, | Performed by: ANESTHESIOLOGY

## 2023-10-26 PROCEDURE — 3079F PR MOST RECENT DIASTOLIC BLOOD PRESSURE 80-89 MM HG: ICD-10-PCS | Mod: CPTII,S$GLB,, | Performed by: ANESTHESIOLOGY

## 2023-10-26 PROCEDURE — 4010F ACE/ARB THERAPY RXD/TAKEN: CPT | Mod: CPTII,S$GLB,, | Performed by: ANESTHESIOLOGY

## 2023-10-26 PROCEDURE — 3008F PR BODY MASS INDEX (BMI) DOCUMENTED: ICD-10-PCS | Mod: CPTII,S$GLB,, | Performed by: ANESTHESIOLOGY

## 2023-10-26 PROCEDURE — 1160F PR REVIEW ALL MEDS BY PRESCRIBER/CLIN PHARMACIST DOCUMENTED: ICD-10-PCS | Mod: CPTII,S$GLB,, | Performed by: ANESTHESIOLOGY

## 2023-10-26 PROCEDURE — 3008F BODY MASS INDEX DOCD: CPT | Mod: CPTII,S$GLB,, | Performed by: ANESTHESIOLOGY

## 2023-10-26 PROCEDURE — 99214 OFFICE O/P EST MOD 30 MIN: CPT | Mod: S$GLB,,, | Performed by: ANESTHESIOLOGY

## 2023-10-26 PROCEDURE — 99999 PR PBB SHADOW E&M-EST. PATIENT-LVL V: ICD-10-PCS | Mod: PBBFAC,,, | Performed by: ANESTHESIOLOGY

## 2023-10-26 PROCEDURE — 99214 PR OFFICE/OUTPT VISIT, EST, LEVL IV, 30-39 MIN: ICD-10-PCS | Mod: S$GLB,,, | Performed by: ANESTHESIOLOGY

## 2023-10-26 PROCEDURE — 99999 PR PBB SHADOW E&M-EST. PATIENT-LVL V: CPT | Mod: PBBFAC,,, | Performed by: ANESTHESIOLOGY

## 2023-10-26 RX ORDER — DICLOFENAC SODIUM 10 MG/G
2 GEL TOPICAL DAILY
Qty: 20 G | Refills: 0 | Status: SHIPPED | OUTPATIENT
Start: 2023-10-26 | End: 2024-02-26

## 2023-10-26 RX ORDER — DULOXETIN HYDROCHLORIDE 30 MG/1
30 CAPSULE, DELAYED RELEASE ORAL DAILY
Qty: 30 CAPSULE | Refills: 0 | Status: SHIPPED | OUTPATIENT
Start: 2023-10-26 | End: 2024-02-26

## 2023-10-26 NOTE — PROGRESS NOTES
[facility] EST Patient Evaluation  Loraine Guillen  : 1955  Date: 10/26/2023     CHIEF COMPLAINT:  Neck Pain and Arm Pain (Left more than right)    Referring Physician No ref. provider found    Primary Care Physician Rubén Jean MD    HPI:  This is a 68 y.o. female with a chief complaint of Neck Pain and Arm Pain (Left more than right)  . 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      Patient was seen and evaluated by Neurology team for tingling bilateral hand, patient had a recommendation for MRI cervical spine that showed multiple level disc osteophyte contacting the anterior cord with multiple level of mild-to-moderate bilateral neural foramina narrowing most pronounced at C3-C4 C4-C5, C5-C6 and C6-C7 with no abnormal signal within the cervical cord.  She sees rheumatologist for Raynaud last seen was in May 2023    Patient had EMG study 2023 suggested minimal right carpal tunnel syndrome, no clear on the left however the procedure was to limited to make any further diagnostic conclusion due to lack of patient tolerance.      Diabetic: No    Anticogualtion drugs: Plavix for mini stroke she has a rash from aspirin    Interval History 10/26/2023:  Loraine Guillen is here for follow up visit.  Patient has completed 6 sessions of physical therapy, patient reported significant improvement in pain and functionality after the physical therapy, her current pain score is 2/10, could not tolerate Lyrica due to side effects, could not afford compound cream or lidocaine patches 5%.    Current Description of Pain Symptoms:  Pain started: 2023  Bilateral Occiptal hedache,  Neck   Radiates to the Kody Ues above the elbow intermittently, with numbness in BL Hand.   Associated symptoms:  Bilateral numbness in lower extremities mainly lateral aspect of the thigh  Pain is Getting worse over the last 3 months    The pain is described as numbing.   Exacerbating factors: Turning side to side  Mitigating factors heat and medications.   Symptoms interfere with daily activity.   The patient feels like symptoms have been worsening.   Patient denies night fever/night sweats, urinary incontinence, bowel incontinence, significant weight loss, significant motor weakness, and loss of sensations.    Pain score:   Current: Pain is 2/10    Current pain medications:        acetaminophen (TYLENOL) 650 MG PRN    Current Opioid Pain Medication:  Opioids- None    UDS  NA    PDMP    Reviewed and consistent with medication use as prescribed.     Pain Treatment History:    Physical Therapy/HEP/Physician Lead exercise program :  She did 6 session, great help    Non-interventional Pain Therapy:  Chiropractor:  Acupuncture:  TENS unit:  Heat/ICE:  Back Brace:    Medications previously tried:  NSAIDs: None  Topical Agent: yes  TCA/SSRI/SNRI: None  Anti-convulsants: Gabapentin  and Lyrica  SE increase hear moeller   Muscle Relaxants: None  Opioids- None  Benzodiazepines: No    Interventional Pain Procedures:   NA    Previous spine/joint surgery:   No    Surgical History:   has a past surgical history that includes Tubal ligation;  Gallstones removed; Cardiac catheterization; Hysterectomy (09/22/14); Oophorectomy (09/22/14); and Colonoscopy (N/A, 8/17/2018).    Medical History     has a past medical history of Anemia, Arthritis, CAD (coronary artery disease), Cataract, CVA (cerebral infarction), Encounter for screening colonoscopy (8/18/2014), Fever blister, GERD (gastroesophageal reflux disease), Glaucoma, Glaucoma suspect of both eyes, H/O amaurosis fugax, Hyperlipemia, Hypertension, Joint pain, and Sleep  apnea.    Family History:  family history includes Breast cancer in her maternal aunt; Diabetes in her brother; Heart attack in her brother; Heart disease in her brother and mother; Hypertension in her brother, father, mother, and sister; Obesity in her sister; Stroke in her father.    Allergies:  Aspirin, Clindamycin, Metoprolol tartrate, Ranexa [ranolazine], Strawberry, and Tramadol     Medications  Current Outpatient Medications   Medication Sig    acetaminophen (TYLENOL) 650 MG TbSR Take 650 mg by mouth every 8 (eight) hours.    albuterol (PROAIR HFA) 90 mcg/actuation inhaler INHALE 2 PUFFS INTO THE LUNGS 4 (FOUR) TIMES DAILY AS NEEDED.    alendronate (FOSAMAX) 10 MG Tab Take 1 tablet (10 mg total) by mouth once daily.    alirocumab (PRALUENT PEN) 150 mg/mL PnIj Inject 2 mL (300 mg total) into the skin as instructed (once a month).    ammonium lactate (LAC-HYDRIN) 12 % lotion Apply topically 3 (three) times daily. Apply to whole body    ascorbic acid, vitamin C, (VITAMIN C) 1000 MG tablet Take 3,000 mg by mouth once daily.    atorvastatin (LIPITOR) 80 MG tablet Take 1 tablet (80 mg total) by mouth every evening.    calcium carbonate (OS-KODY) 500 mg calcium (1,250 mg) tablet Take 1 tablet (500 mg total) by mouth once daily.    carvediloL (COREG) 12.5 MG tablet TAKE 1 TABLET BY MOUTH TWICE A DAY WITH MEALS    cholecalciferol, vitamin D3, (VITAMIN D3) 25 mcg (1,000 unit) capsule Take 3,000 Units by mouth once daily.    clobetasoL (TEMOVATE) 0.05 % cream Apply topically 2 (two) times daily. Apply small amount to left leg only .    clopidogreL (PLAVIX) 75 mg tablet TAKE 1 TABLET BY MOUTH EVERY DAY    cyanocobalamin, vitamin B-12, 50 mcg tablet Take 50 mcg by mouth every 30 days.    docusate sodium (COLACE) 100 MG capsule Take 1 capsule (100 mg total) by mouth 2 (two) times daily. (Patient taking differently: Take 100 mg by mouth 2 (two) times daily as needed.)    ezetimibe (ZETIA) 10 mg tablet TAKE 1 TABLET BY MOUTH  "EVERY DAY    famotidine (PEPCID) 40 MG tablet Take 1 tablet (40 mg total) by mouth once daily.    fluticasone furoate-vilanteroL (BREO ELLIPTA) 200-25 mcg/dose DsDv diskus inhaler Inhale 1 puff into the lungs once daily. Controller    furosemide (LASIX) 20 MG tablet TAKE 1 TABLET BY MOUTH DAILY AS NEEDED FOR FLUID OVERLOAD    hydrocortisone 2.5 % cream Apply to dark patch on leg bid .    irbesartan (AVAPRO) 150 MG tablet TAKE 1 TABLET BY MOUTH EVERY EVENING.    isosorbide mononitrate (IMDUR) 30 MG 24 hr tablet TAKE 1 TABLET BY MOUTH EVERY DAY    ketoconazole (NIZORAL) 2 % cream Have patient mix ketoconazole cream, triamcinolone cream and milk of magnesia in  1:1:1 ratio.  Patient has sterile jar .  Patient to apply mixture to rash bid prn. (Patient taking differently: Have patient mix ketoconazole cream, triamcinolone cream and milk of magnesia in  1:1:1 ratio.  Patient has sterile jar .  Patient to apply mixture to rash bid prn.)    latanoprost (XALATAN) 0.005 % ophthalmic solution Place 1 drop into both eyes once daily.    montelukast (SINGULAIR) 10 mg tablet Take 1 tablet (10 mg total) by mouth nightly.    mupirocin (BACTROBAN) 2 % ointment Apply topically 3 (three) times daily. Apply to incest bites as needed.    mv-min-folic-calcium carb-K1 400 mcg-500 mg calcium-20 mcg Tab Take 1 tablet by mouth once daily.    nitroGLYCERIN (NITROSTAT) 0.4 MG SL tablet Place 1 tablet (0.4 mg total) under the tongue every 5 (five) minutes as needed for Chest pain. PLACE 1 TABLET (0.4 MG TOTAL) UNDER THE TONGUE EVERY 5 (FIVE) MINUTES AS NEEDED FOR CHEST PAIN.    RESTASIS 0.05 % ophthalmic emulsion Place 1 drop into both eyes 2 (two) times daily.    solifenacin (VESICARE) 5 MG tablet Take 1 tablet (5 mg total) by mouth once daily.    vitamin E 100 UNIT capsule Take 100 Units by mouth once daily. Pt states she takes "2000" units daily    diclofenac sodium (VOLTAREN) 1 % Gel Apply 2 g topically once daily. for 15 days    DULoxetine " (CYMBALTA) 30 MG capsule Take 1 capsule (30 mg total) by mouth once daily.     Current Facility-Administered Medications   Medication    triamcinolone acetonide injection 40 mg      Social History/SUBSTANCE ABUSE HISTORY:  Personal history of substance abuse: No     reports that she has never smoked. She has never used smokeless tobacco. She reports current alcohol use. She reports that she does not use drugs.    LABS:  CBC  Lab Results   Component Value Date    WBC 3.25 (L) 07/31/2023    HGB 11.8 (L) 07/31/2023    HCT 34.6 (L) 07/31/2023    MCV 79 (L) 07/31/2023     07/31/2023       CMP  Sodium   Date Value Ref Range Status   07/31/2023 141 136 - 145 mmol/L Final     Potassium   Date Value Ref Range Status   07/31/2023 4.3 3.5 - 5.1 mmol/L Final     Chloride   Date Value Ref Range Status   07/31/2023 107 95 - 110 mmol/L Final     CO2   Date Value Ref Range Status   07/31/2023 25 23 - 29 mmol/L Final     Glucose   Date Value Ref Range Status   07/31/2023 78 70 - 110 mg/dL Final     BUN   Date Value Ref Range Status   07/31/2023 18 8 - 23 mg/dL Final     Creatinine   Date Value Ref Range Status   07/31/2023 0.7 0.5 - 1.4 mg/dL Final     Calcium   Date Value Ref Range Status   07/31/2023 8.8 8.7 - 10.5 mg/dL Final     Total Protein   Date Value Ref Range Status   07/31/2023 6.6 6.0 - 8.4 g/dL Final     Albumin   Date Value Ref Range Status   07/31/2023 3.7 3.5 - 5.2 g/dL Final     Total Bilirubin   Date Value Ref Range Status   07/31/2023 0.7 0.1 - 1.0 mg/dL Final     Comment:     For infants and newborns, interpretation of results should be based  on gestational age, weight and in agreement with clinical  observations.    Premature Infant recommended reference ranges:  Up to 24 hours.............<8.0 mg/dL  Up to 48 hours............<12.0 mg/dL  3-5 days..................<15.0 mg/dL  6-29 days.................<15.0 mg/dL       Alkaline Phosphatase   Date Value Ref Range Status   07/31/2023 41 (L) 55 - 135 U/L  "Final     AST   Date Value Ref Range Status   07/31/2023 17 10 - 40 U/L Final     ALT   Date Value Ref Range Status   07/31/2023 13 10 - 44 U/L Final     Anion Gap   Date Value Ref Range Status   07/31/2023 9 8 - 16 mmol/L Final     eGFR if    Date Value Ref Range Status   06/21/2022 >60.0 >60 mL/min/1.73 m^2 Final     eGFR if non    Date Value Ref Range Status   06/21/2022 >60.0 >60 mL/min/1.73 m^2 Final     Comment:     Calculation used to obtain the estimated glomerular filtration  rate (eGFR) is the CKD-EPI equation.          HGBA1C  Lab Results   Component Value Date    HGBA1C 5.3 10/20/2020       ROS:    Review of Systems   Musculoskeletal:  Positive for neck pain and neck stiffness.   Neurological:  Positive for numbness and headaches.        GENERAL:  No weight loss, malaise or fevers.  HEENT:   No recent changes in vision or hearing  NECK:  Negative for lumps, no difficulty with swallowing.  RESPIRATORY:  Negative for cough, wheezing or shortness of breath, patient denies any recent URI.  CARDIOVASCULAR:  Negative for chest pain, leg swelling or palpitations.  GI:  Negative for abdominal discomfort, blood in stools or black stools or change in bowel habits.  MUSCULOSKELETAL:  See HPI.  SKIN:  Negative for lesions, rash, and itching.  PSYCH:  No mood disorder or recent psychosocial stressors.   HEMATOLOGY/LYMPHOLOGY:  Positive for prolonged bleeding, bruising easily or swollen nodes.  Patient is n currently taking anti-coagulants  NEURO:  See HPI  All other reviewed and negative other than HPI.    PHYSICAL EXAM:    VITALS: /88   Pulse 66   Resp 18   Ht 5' 5" (1.651 m)   Wt (!) 137.4 kg (303 lb)   LMP  (LMP Unknown)   SpO2 100%   BMI 50.42 kg/m²   Body mass index is 50.42 kg/m².    GENERAL: Well appearing, in no acute distress, alert and oriented x3, answers questions appropriately.   PSYCH: Flat affect.    SKIN: Skin color, texture, turgor normal, no rashes or " lesions.  HEAD/FACE:  Normocephalic, atraumatic. Cranial nerves grossly intact.  CV: Regular rate  PULM: No evidence of respiratory difficulty, symmetric chest rise.  GI:  Soft and non-Distended.    NECK: (+) pain to palpation over the cervical paraspinous muscles. Spurling: +. (+) pain with neck flexion, extension, or lateral flexion, Muscle strength in RT UE 55/5 and Left UE 5/5, Hand  5/5, Mary test is negative bilaterally.    Hip Exam:      Inspection: No gross deformity or apparent leg length discrepancy      Palpation:  No TTP to bilateral greater trochanteric bursas.       ROM:  No limitation or pain in internal rotation, external rotation b/l  Neurologic Exam:     Alert. Speech is fluent and appropriate.     Strength: 5/5 in right hip flexion and knee extension, otherwise 5/5 throughout bilateral lower extremities     Sensation:  Grossly intact to light touch in bilateral lower extremities     Tone: No abnormality appreciated in bilateral lower extremities     No Clonus    GAIT:  Stable    DIAGNOSTIC STUDIES AND MEDICAL RECORDS REVIEW:        I have personally reviewed and interpreted relevant radiology reports and reviewed relevant records from other services in the EMR.      EMG 08/2023     Impression:  Limited study due to poor patient tolerance. Suggestion of minimal right carpal tunnel syndrome is noted. No clear CTS on the left. The procedure was too limited to make any further diagnostic conclusions       MRI CERVICAL SPINE WITHOUT CONTRAST August 2023        FINDINGS:  Reversal the normal lordosis.  Mild multilevel disc desiccation and loss of disc height, most pronounced C3-4.     C2-3: Broad-based disc-osteophyte with contact with the anterior cord.     C3-4: Broad-based disc-osteophyte with contact with the anterior cord.  Uncinate hypertrophy with moderate bilateral foraminal stenosis.     C4-5: Broad-based disc-osteophyte with contact with the anterior cord.  Uncinate hypertrophy with  mild bilateral foraminal stenosis.     C5-6: No significant disc bulge.  Uncinate hypertrophy with mild bilateral foraminal stenosis.     C6-7: No significant disc bulge.  Uncinate hypertrophy with mild left foraminal stenosis.     C7-T1: No significant disc bulge.     No abnormal signal within the cervical cord.  No abnormality seen within the visualized portions of the posterior fossa.  No vertebral body marrow edema noted.     Impression:     Disc-osteophyte C2-3, C3-4, and C4-5 with contact with the anterior cord.    ASSESSMENT AND PLAN:  Loraine Guillen is a 68 y.o. female with the following diagnoses based on history, exam, and imagin. DDD (degenerative disc disease), cervical        2. Arthropathy of cervical facet joint          This is a pleasant 68 y.o. lady presenting with neck pain, bilateral occipital headache, bilateral hand numbness started around 2023 that has been worsening over the last 2 or 3 months, pain is worse with turning side-to-side, on physical examination she has tenderness point over the cervical paraspinal muscle bilaterally, negative Mary's sign, positive Spurling's test, handgrip is 5/5.    Has significant improvement from physical therapy, she could not tolerate Lyrica, she could not afford lidocaine patch or compound cream  Patient has cervical facet arthropathy in addition to some element with carpal tunnel syndrome, I do not believe that she have cervical radiculopathy based on the MRI results with the cervical spine on 2023, EMG/nerve conduction study was inconclusive      Treatment Plan   1-Diagnostics/Referrals:  Continue  PT for neck pain     2-Medications:  NSAIDs: None  Topical Agent:  Voltaren gel  TCA/SSRI/SNRI:  Start Cymbalta 30 mg q.h.s., prescription was provided for 1 month  Anti-convulsants: Lyrica  she had SE: heat racing   Muscle Relaxants: None  Opioids- None    3-Interventional Therapy: NA    4-Physical Rehabilitation: continue  Referral to Physical therapy for Cervical ROM, stretching, strengthening, and a home exercise program.    5-Patient Education Counseled patient regarding the importance of activity modification and physical therapy    6-Follow-up: RTC 2 months    May consider:  Cervical facet injection(she prefers no injection), increasing Cymbalta, considering different muscle relaxants      Violetta Mcgraw MD  Interventional Pain Medicine  10/26/2023    Disclaimer:  This note was prepared using voice recognition system and is likely to have sound alike errors that may have been overlooked even after proof reading.  Please call me with any questions.

## 2023-10-30 NOTE — PROGRESS NOTES
[facility]   Physical Therapy Treatment Note      Name: Loraine Guillen  Clinic Number: [id number]    Therapy Diagnosis:   Encounter Diagnoses   Name Primary?    DDD (degenerative disc disease), cervical Yes    Decreased range of motion of intervertebral discs of cervical spine     Decreased strength of upper extremity          Physician: Violetta Mcgraw MD    Visit Date: 10/31/2023    Physician: Violetta Mcgraw MD     Physician Orders: PT Eval and Treat   Medical Diagnosis from Referral: M50.30 (ICD-10-CM) - DDD (degenerative disc disease), cervical   Evaluation Date: 10/2/2023  Authorization Period Expiration: 11/23/2023  Plan of Care Expiration: 12/31/2023  Progress Note Due: VISIT 4   Visit # / Visits authorized: 2/ 23   FOTO: 1/3     Precautions: Standard      Time In: 1345  Time Out:1430  Total Appointment Time (timed & untimed codes):  45 minutes    PTA Visit #: 4/5       Subjective     Patient reports: that she is having neck pain today and took a pain pill before coming to PT today.     She was compliant with home exercise program  am.  Response to previous treatment:  Functional change:    Pain: 5/10  Location: neck pain     Objective      Objective Measures updated at progress report unless specified.     Treatment     Loraine received the treatments listed below:      therapeutic exercises to develop strength, ROM, flexibility, posture, and core stabilization for 30 minutes including:   Review of HEP  Seated Cervical extension with towel 5  x10 sec hold   Cervical rotation stretch 5x10   sec hold bilaterally   Standing ER with red thera band 2x10 reps  Standing scapular rows 2x10 reps with red band  Supine shoulder flexion 1# bar 2x10  with head head of mat elevated  Supine scapular pinches  head of mat elevated 2x10 2 sec hold  Supine chest press with 1# bar 2x10     Not addressed Today:   UBE 2 minutes forward, 2 minutes backward L1    manual therapy techniques:  Gentle Soft  tissue Mobilization and PROM R and L rotation were applied to the minutes  to neck, upper traps including: for pain relief.     neuromuscular re-education activities to improve: Balance, Coordination, and Posture for 00 minutes. The following activities were included: Not addressed     therapeutic activities to improve functional performance for 00  minutes, including:not addressed today    gait training to improve functional mobility and safety for 00  minutes, including: not addressed today    direct contact modalities after being cleared for contraindications:     supervised modalities after being cleared for contradictions:     hot pack for 10 minutes to posterior neck for pain relief at the end of PT session.     cold pack for 00 minutes to Not addressed .    Patient Education and Home Exercises       Education provided:   - review  of HEP, use of heat on neck for up to 10 to 15  minutes for pain relief 3-4 times a day. Issued red thera band scapular rows.     Written Home Exercises Provided: yes. Exercises were reviewed and Loraine was able to demonstrate them prior to the end of the session.  Loraine demonstrated good  understanding of the education provided. See Electronic Medical Record under Patient Instructions for exercises provided during therapy sessions    Assessment     Patient tolerated PT session well with PT activity today. Patient improved ROM observed with cervical rotation stretches with use of towel.Patient required verbal and tactile cues for improving technique with cervical rotation with use of towel. Patient able to demonstrate after cuing.    Loraine Is progressing well towards her goals.   Patient prognosis is Good.     Patient will continue to benefit from skilled outpatient physical therapy to address the deficits listed in the problem list box on initial evaluation, provide pt/family education and to maximize pt's level of independence in the home and community environment.     Patient's  spiritual, cultural and educational needs considered and pt agreeable to plan of care and goals.     Anticipated barriers to physical therapy: pain amplification, chronic nature    Goals:   Short Term Goals: 4 weeks   1. Patient demonstrates independence with HEP.   2. Patient demonstrates independence with Postural Awareness while perform seated activities such as fátima/knitting    3. Patient demonstrates independence with body mechanics during sitting   4. Patient will improve cervical range of motion by 5 degrees in restricted range of motion.      Long Term Goals: 8 weeks   1. Patient demonstrates increased cervical range of motion to improve tolerance to functional activities.   2. Patient demonstrates increased strength BUE's to 4/5 or greater to improve tolerance to functional activities.   3. Patient demonstrates improved overall function per FOTO to 39% or less.   4. Patient will report pain 2/10 during scanning activities.       Plan     Plan of care Certification: 10/2/2023 to 12/31/2023.     Outpatient Physical Therapy 2 times weekly for 8 weeks to include the following interventions: Cervical/Lumbar Traction, Electrical Stimulation  , Gait Training, Manual Therapy, Moist Heat/ Ice, Neuromuscular Re-ed, Orthotic Management and Training, Patient Education, Self Care, Therapeutic Activities, and Therapeutic Exercise.        Renae Goodell, PTA

## 2023-10-31 ENCOUNTER — CLINICAL SUPPORT (OUTPATIENT)
Dept: REHABILITATION | Facility: HOSPITAL | Age: 68
End: 2023-10-31
Attending: ANESTHESIOLOGY
Payer: MEDICARE

## 2023-10-31 DIAGNOSIS — M53.82 DECREASED RANGE OF MOTION OF INTERVERTEBRAL DISCS OF CERVICAL SPINE: ICD-10-CM

## 2023-10-31 DIAGNOSIS — R29.898 DECREASED STRENGTH OF UPPER EXTREMITY: ICD-10-CM

## 2023-10-31 DIAGNOSIS — M50.30 DDD (DEGENERATIVE DISC DISEASE), CERVICAL: Primary | ICD-10-CM

## 2023-10-31 PROCEDURE — 97110 THERAPEUTIC EXERCISES: CPT | Mod: PN,CQ

## 2023-11-02 ENCOUNTER — CLINICAL SUPPORT (OUTPATIENT)
Dept: REHABILITATION | Facility: HOSPITAL | Age: 68
End: 2023-11-02
Attending: ANESTHESIOLOGY
Payer: MEDICARE

## 2023-11-02 DIAGNOSIS — M53.82 DECREASED RANGE OF MOTION OF INTERVERTEBRAL DISCS OF CERVICAL SPINE: ICD-10-CM

## 2023-11-02 DIAGNOSIS — M50.30 DDD (DEGENERATIVE DISC DISEASE), CERVICAL: Primary | ICD-10-CM

## 2023-11-02 DIAGNOSIS — R29.898 DECREASED STRENGTH OF UPPER EXTREMITY: ICD-10-CM

## 2023-11-02 PROCEDURE — 97110 THERAPEUTIC EXERCISES: CPT | Mod: PN,CQ

## 2023-11-02 NOTE — PROGRESS NOTES
[facility]   Physical Therapy Treatment Note      Name: Loraine Guillen  Clinic Number: [id number]    Therapy Diagnosis:   Encounter Diagnoses   Name Primary?    DDD (degenerative disc disease), cervical Yes    Decreased range of motion of intervertebral discs of cervical spine     Decreased strength of upper extremity          Physician: Violetta Mcgraw MD    Visit Date: 11/2/2023    Physician: Violetta Mcgraw MD     Physician Orders: PT Eval and Treat   Medical Diagnosis from Referral: M50.30 (ICD-10-CM) - DDD (degenerative disc disease), cervical   Evaluation Date: 10/2/2023  Authorization Period Expiration: 11/23/2023  Plan of Care Expiration: 12/31/2023  Progress Note Due: VISIT 4   Visit # / Visits authorized: 5/ 23   FOTO: 1/3     Precautions: Standard      Time In: 1345  Time Out:1430  Total Appointment Time (timed & untimed codes):  45 minutes    PTA Visit #: 6/5       Subjective     Patient reports: Patient presents with c/o neck pain. She reports she is still having trouble moving her neck. She notices this most when she is driving, has trouble looking both ways. Has difficulty finding a comfortabale position to lie down in. She does feel that she has improved some and has a little bit more ROM. She is using heat for pain relief. She experiences N/T into her RUE at times, but does not go past her elbow.     She was compliant with home exercise program  am.  Response to previous treatment:  Functional change:    Pain: 7/10  Location: neck pain     Objective    OBSERVATIONS:  Dowager's hump  POSTURE:  Patient is significant for moderate forward head and internally rotated shoulders. Upper t-spine flexion also observed.            CERVICAL ACTIVE RANGE OF MOTION   Flexion 10   Extension 30   Right Side Bend 18   Left Side Bend 26   Right Rotation 45   Left Rotation 50             SHOULDER ACTIVE RANGE OF MOTION     LEFT RIGHT   Flexion 160 150   Scaption 160 150   External  Rotation occiput T3   Internal Rotation (Apley Scratch Test) Iliac crest Iliac crest           UPPER LOWER EXTREMITY STRENGTH     LEFT RIGHT   Shoulder Flexion 4/5 4+/5   Shoulder Abduction 4/5 4+/5   Shoulder External Rotation 5/5 4+/5   Shoulder Internal Rotation 4/5 4/5   Elbow Flexion 4+/5 5/5   Elbow Extension 5/5 5/5    Strength 5 5   ** patient was able to grab both purse, keys, and maintain  on theraband with bilateral hands      DERMATOMES: Sensation: Light Touch: Intact, decrease on her left thumb area          SPECIAL TESTS   Vertbral Artery Screen Negative   Sharps Brett Test Negative   Alar Ligament Test Negative   Transverse Ligament Test Negative   Sprulings Test -   Distraction Test -      PALPATION:  Patient reports primary pain region along the following, but no guarding and increase muscle tone noted:   (+) Upper Trapezius bilateral  (+) Levator Scapulae bilateral  (+) Sub-Occipital bilateral  (+) Paraspainals bilateral     Passive IV Motion Testing: unable to test, as pt had difficulty with allowing passive motion      Limitation/Restriction for FOTO Cervical Survey     Therapist reviewed FOTO scores for Loraine Guillen on 10/2/2023.   FOTO documents entered into [employer] - see Media section.     Limitation Score: 47%               Treatment     Loraine received the treatments listed below:      therapeutic exercises to develop strength, ROM, flexibility, posture, and core stabilization for 45 minutes including:   Reassessment 35  minutes  Seated ER with red thera band 2x10 reps  Standing scapular rows 2x10 reps with red band  Seated cervical flexion 2x5 reps with 3 sec hold    Not addressed Today:   UBE 2 minutes forward, 2 minutes backward L1  Seated Cervical extension with towel 5x10 sec hold   Cervical rotation stretch [id number] hold bilaterally   Supine shoulder flexion 1# bar 2x10  with head head of mat elevated  Supine scapular pinches  head of mat elevated 2x10 2 sec hold  Supine  chest press with 1# bar 2x10     manual therapy techniques:  Gentle Soft tissue Mobilization and PROM R and L rotation were applied to the minutes  to neck, upper traps including: for pain relief.     neuromuscular re-education activities to improve: Balance, Coordination, and Posture for 00 minutes. The following activities were included: Not addressed     therapeutic activities to improve functional performance for 00  minutes, including:not addressed today    gait training to improve functional mobility and safety for 00  minutes, including: not addressed today    direct contact modalities after being cleared for contraindications:     supervised modalities after being cleared for contradictions:     hot pack for 00 minutes to posterior neck for pain relief at the end of PT session. Not addressed     cold pack for 00 minutes to Not addressed .    Patient Education and Home Exercises       Education provided:   - review  of HEP, use of heat on neck for up to 10 to 15  minutes for pain relief 3-4 times a day. Issued red thera band scapular rows.     Written Home Exercises Provided: yes. Exercises were reviewed and Loraine was able to demonstrate them prior to the end of the session.  Loraine demonstrated good  understanding of the education provided. See Electronic Medical Record under Patient Instructions for exercises provided during therapy sessions    Assessment     Patient tolerated PT session well with PT activity today. Patient limited with cervical flexion ROM, added cervical ROM stretch to improve flexibility. Will continue to work on flexibility to improve ROM cervical spine to decrease pain symptoms.   Loraine Is progressing well towards her goals.   Patient prognosis is Good.     Patient will continue to benefit from skilled outpatient physical therapy to address the deficits listed in the problem list box on initial evaluation, provide pt/family education and to maximize pt's level of independence in the  home and community environment.     Patient's spiritual, cultural and educational needs considered and pt agreeable to plan of care and goals.     Anticipated barriers to physical therapy: pain amplification, chronic nature    Goals:   Short Term Goals: 4 weeks   1. Patient demonstrates independence with HEP. Goal Met  2. Patient demonstrates independence with Postural Awareness while perform seated activities such as fátima/knitting. Goal Progressing  3. Patient demonstrates independence with body mechanics during sitting. Goal Progressing   4. Patient will improve cervical range of motion by 5 degrees in restricted range of motion. Goal Progressing     Long Term Goals: 8 weeks   1. Patient demonstrates increased cervical range of motion to improve tolerance to functional activities.   2. Patient demonstrates increased strength BUE's to 4/5 or greater to improve tolerance to functional activities. Goal Met  3. Patient demonstrates improved overall function per FOTO to 39% or less.   4. Patient will report pain 2/10 during scanning activities.       Plan     Plan of care Certification: 10/2/2023 to 12/31/2023.     Outpatient Physical Therapy 2 times weekly for 8 weeks to include the following interventions: Cervical/Lumbar Traction, Electrical Stimulation  , Gait Training, Manual Therapy, Moist Heat/ Ice, Neuromuscular Re-ed, Orthotic Management and Training, Patient Education, Self Care, Therapeutic Activities, and Therapeutic Exercise.        Renae Goodell, PTA

## 2023-11-10 NOTE — PROGRESS NOTES
[facility]   Physical Therapy Treatment Note      Name: Loraine Guillen  Clinic Number: [id number]    Therapy Diagnosis:   Encounter Diagnoses   Name Primary?    DDD (degenerative disc disease), cervical Yes    Decreased range of motion of intervertebral discs of cervical spine     Decreased strength of upper extremity      Physician: Violetta Mcgraw MD    Visit Date: 11/13/2023    Physician: Violetta Mcgraw MD     Physician Orders: PT Eval and Treat   Medical Diagnosis from Referral: M50.30 (ICD-10-CM) - DDD (degenerative disc disease), cervical   Evaluation Date: 10/2/2023  Authorization Period Expiration: 11/23/2023  Plan of Care Expiration: 12/31/2023  Progress Note Due: VISIT 4   Visit # / Visits authorized: 5/ 23   FOTO: 1/3     Precautions: Standard      Time In: 1110 (10 minutes late)  Time Out:1146  Total Appointment Time (timed & untimed codes):  36 minutes    PTA Visit #: 1/5       Subjective     Patient reports: Patient presents with c/o neck pain. She reports she is still having trouble moving her neck. Patient states she went to a concert over the weekend and was turning her head a lot.   She was compliant with home exercise program  am.  Response to previous treatment:  Functional change:    Pain: 5/10  Location: neck pain     Objective    OBSERVATIONS:  Dowager's hump  POSTURE:  Patient is significant for moderate forward head and internally rotated shoulders. Upper t-spine flexion also observed.            CERVICAL ACTIVE RANGE OF MOTION   Flexion 10   Extension 30   Right Side Bend 18   Left Side Bend 26   Right Rotation 45   Left Rotation 50             SHOULDER ACTIVE RANGE OF MOTION     LEFT RIGHT   Flexion 160 150   Scaption 160 150   External Rotation occiput T3   Internal Rotation (Apley Scratch Test) Iliac crest Iliac crest           UPPER LOWER EXTREMITY STRENGTH     LEFT RIGHT   Shoulder Flexion 4/5 4+/5   Shoulder Abduction 4/5 4+/5   Shoulder External  Rotation 5/5 4+/5   Shoulder Internal Rotation 4/5 4/5   Elbow Flexion 4+/5 5/5   Elbow Extension 5/5 5/5    Strength 5 5   ** patient was able to grab both purse, keys, and maintain  on theraband with bilateral hands      DERMATOMES: Sensation: Light Touch: Intact, decrease on her left thumb area          SPECIAL TESTS   Vertbral Artery Screen Negative   Sharps Brett Test Negative   Alar Ligament Test Negative   Transverse Ligament Test Negative   Sprulings Test -   Distraction Test -      PALPATION:  Patient reports primary pain region along the following, but no guarding and increase muscle tone noted:   (+) Upper Trapezius bilateral  (+) Levator Scapulae bilateral  (+) Sub-Occipital bilateral  (+) Paraspainals bilateral     Passive IV Motion Testing: unable to test, as pt had difficulty with allowing passive motion      Limitation/Restriction for FOTO Cervical Survey     Therapist reviewed FOTO scores for Loraine Guillen on 10/2/2023.   FOTO documents entered into [employer] - see Media section.     Limitation Score: 47%           Treatment     Loraine received the treatments listed below:      therapeutic exercises to develop strength, ROM, flexibility, posture, and core stabilization for 16 minutes including:   Standing UE extension 2x10 reps with Red TB  Seated ER with red thera band 2x10 reps  Seated scapular rows 2x10 reps  Standing rows with red TB 2x10 reps    Not addressed Today:   UBE 2 minutes forward, 2 minutes backward L1  Cervical rotation stretch [id number] hold bilaterally   Supine chest press with 1# bar 2x10     manual therapy techniques:  Gentle Soft tissue Mobilization and PROM R and L rotation were applied to the minutes  to neck, upper traps including: for pain relief: Not addressed Today    neuromuscular re-education activities to improve: Balance, Coordination, and Posture for 00 minutes. The following activities were included: Not addressed     therapeutic activities to improve  functional performance for 15  minutes, including:  Seated Cervical extension with towel 5x10 sec hold   Seated cervical flexion x5 reps with 10 sec hold  Supine shoulder flexion 1# bar 2x10  with head head of mat elevated    gait training to improve functional mobility and safety for 00  minutes, including: not addressed today    direct contact modalities after being cleared for contraindications:     supervised modalities after being cleared for contradictions:     hot pack for 00 minutes to posterior neck for pain relief at the end of PT session. Not addressed     cold pack for 00 minutes to Not addressed .    Patient Education and Home Exercises       Education provided:   - review  of HEP,     Written Home Exercises Provided: yes. Exercises were reviewed and Loraine was able to demonstrate them prior to the end of the session.  Loraine demonstrated good  understanding of the education provided. See Electronic Medical Record under Patient Instructions for exercises provided during therapy sessions    Assessment     Patient tolerated PT session well with PT activity today. Patient continues to require education on correct technique with home exercise program. Will continue to work on flexibility to improve ROM cervical spine to decrease pain symptoms and educating patient on how to do the exercises correctly.   Loraine Is progressing well towards her goals.   Patient prognosis is Good.     Patient will continue to benefit from skilled outpatient physical therapy to address the deficits listed in the problem list box on initial evaluation, provide pt/family education and to maximize pt's level of independence in the home and community environment.     Patient's spiritual, cultural and educational needs considered and pt agreeable to plan of care and goals.     Anticipated barriers to physical therapy: pain amplification, chronic nature    Goals:   Short Term Goals: 4 weeks   1. Patient demonstrates independence with  HEP. Goal Met  2. Patient demonstrates independence with Postural Awareness while perform seated activities such as fátima/knitting. Goal Progressing  3. Patient demonstrates independence with body mechanics during sitting. Goal Progressing   4. Patient will improve cervical range of motion by 5 degrees in restricted range of motion. Goal Progressing     Long Term Goals: 8 weeks   1. Patient demonstrates increased cervical range of motion to improve tolerance to functional activities.   2. Patient demonstrates increased strength BUE's to 4/5 or greater to improve tolerance to functional activities. Goal Met  3. Patient demonstrates improved overall function per FOTO to 39% or less.   4. Patient will report pain 2/10 during scanning activities.       Plan     Plan of care Certification: 10/2/2023 to 12/31/2023.     Outpatient Physical Therapy 2 times weekly for 8 weeks to include the following interventions: Cervical/Lumbar Traction, Electrical Stimulation  , Gait Training, Manual Therapy, Moist Heat/ Ice, Neuromuscular Re-ed, Orthotic Management and Training, Patient Education, Self Care, Therapeutic Activities, and Therapeutic Exercise.        Renae Goodell, PTA

## 2023-11-13 ENCOUNTER — CLINICAL SUPPORT (OUTPATIENT)
Dept: REHABILITATION | Facility: HOSPITAL | Age: 68
End: 2023-11-13
Attending: ANESTHESIOLOGY
Payer: MEDICARE

## 2023-11-13 DIAGNOSIS — M50.30 DDD (DEGENERATIVE DISC DISEASE), CERVICAL: Primary | ICD-10-CM

## 2023-11-13 DIAGNOSIS — M53.82 DECREASED RANGE OF MOTION OF INTERVERTEBRAL DISCS OF CERVICAL SPINE: ICD-10-CM

## 2023-11-13 DIAGNOSIS — R29.898 DECREASED STRENGTH OF UPPER EXTREMITY: ICD-10-CM

## 2023-11-13 PROCEDURE — 97110 THERAPEUTIC EXERCISES: CPT | Mod: PN,CQ

## 2023-11-13 PROCEDURE — 97530 THERAPEUTIC ACTIVITIES: CPT | Mod: PN,CQ

## 2023-11-14 NOTE — PROGRESS NOTES
[facility]   Physical Therapy Treatment Note      Name: Loraine Guillen  Clinic Number: [id number]    Therapy Diagnosis:   Encounter Diagnoses   Name Primary?    DDD (degenerative disc disease), cervical Yes    Decreased range of motion of intervertebral discs of cervical spine     Decreased strength of upper extremity        Physician: Violetta Mcgraw MD    Visit Date: 11/15/2023    Physician: Violetta Mcgraw MD     Physician Orders: PT Eval and Treat   Medical Diagnosis from Referral: M50.30 (ICD-10-CM) - DDD (degenerative disc disease), cervical   Evaluation Date: 10/2/2023  Authorization Period Expiration: 11/23/2023  Plan of Care Expiration: 12/31/2023  Progress Note Due: VISIT 4   Visit # / Visits authorized: 5/ 23   FOTO: 1/3     Precautions: Standard      Time In: 1030  Time Out:1110  Total Appointment Time (timed & untimed codes):  40 minutes    PTA Visit #: 2/5       Subjective     Patient reports: that her neck is feeling pretty good today. Patient states she continues to have neck pain when rotating R and L.   She was compliant with home exercise program  am.  Response to previous treatment:  Functional change:    Pain: 7/10  Location: neck pain     Objective    OBSERVATIONS:  Dowager's hump  POSTURE:  Patient is significant for moderate forward head and internally rotated shoulders. Upper t-spine flexion also observed.            CERVICAL ACTIVE RANGE OF MOTION   Flexion 10   Extension 30   Right Side Bend 18   Left Side Bend 26   Right Rotation 45   Left Rotation 50             SHOULDER ACTIVE RANGE OF MOTION     LEFT RIGHT   Flexion 160 150   Scaption 160 150   External Rotation occiput T3   Internal Rotation (Apley Scratch Test) Iliac crest Iliac crest           UPPER LOWER EXTREMITY STRENGTH     LEFT RIGHT   Shoulder Flexion 4/5 4+/5   Shoulder Abduction 4/5 4+/5   Shoulder External Rotation 5/5 4+/5   Shoulder Internal Rotation 4/5 4/5   Elbow Flexion 4+/5 5/5   Elbow  Extension 5/5 5/5    Strength 5 5   ** patient was able to grab both purse, keys, and maintain  on theraband with bilateral hands      DERMATOMES: Sensation: Light Touch: Intact, decrease on her left thumb area          SPECIAL TESTS   Vertbral Artery Screen Negative   Sharps Brett Test Negative   Alar Ligament Test Negative   Transverse Ligament Test Negative   Sprulings Test -   Distraction Test -      PALPATION:  Patient reports primary pain region along the following, but no guarding and increase muscle tone noted:   (+) Upper Trapezius bilateral  (+) Levator Scapulae bilateral  (+) Sub-Occipital bilateral  (+) Paraspainals bilateral     Passive IV Motion Testing: unable to test, as pt had difficulty with allowing passive motion      Limitation/Restriction for FOTO Cervical Survey     Therapist reviewed FOTO scores for Loriane Guillen on 10/2/2023.   FOTO documents entered into [employer] - see Media section.     Limitation Score: 47%           Treatment     Loraine received the treatments listed below:      therapeutic exercises to develop strength, ROM, flexibility, posture, and core stabilization for 30 minutes including:     Standing rows with red TB 2x10 reps  Horizontal Abduction 2x10 reps   UBE 2 minutes forward, 2 minutes backward L1  Pec stretch 3x20 sec hold     Not addressed Today:   Cervical rotation stretch [id number] hold bilaterally   Supine chest press with 1# bar 2x10     manual therapy techniques:  Gentle Soft tissue Mobilization  and R and L rotation were applied for 10 minutes  to neck, upper traps including: for pain relief and to improve cervical ROM.    neuromuscular re-education activities to improve: Balance, Coordination, and Posture for 00 minutes. The following activities were included: Not addressed     therapeutic activities to improve functional performance for 00  minutes, including:Not addressed    Not Addressed Today:   Seated Cervical extension with towel 5x10 sec hold    Seated cervical flexion x5 reps with 10 sec hold  Supine shoulder flexion 1# bar 2x10  with head head of mat elevated    gait training to improve functional mobility and safety for 00  minutes, including: not addressed today    direct contact modalities after being cleared for contraindications:     supervised modalities after being cleared for contradictions:     hot pack for 00 minutes to posterior neck for pain relief at the end of PT session. Not addressed     cold pack for 00 minutes to Not addressed .    Patient Education and Home Exercises       Education provided:   - review  of HEP,     Written Home Exercises Provided: yes. Exercises were reviewed and Loraine was able to demonstrate them prior to the end of the session.  Loraine demonstrated good  understanding of the education provided. See Electronic Medical Record under Patient Instructions for exercises provided during therapy sessions    Assessment     Patient tolerated PT session well with PT activity today. Patient having tenderness with soft tissue mobilization on neck and upper traps. Will continue to work on flexibility to improve ROM cervical spine to decrease pain symptoms and continued education on correct technique with therapeutic exercises.   Loraine Is progressing well towards her goals.   Patient prognosis is Good.     Patient will continue to benefit from skilled outpatient physical therapy to address the deficits listed in the problem list box on initial evaluation, provide pt/family education and to maximize pt's level of independence in the home and community environment.     Patient's spiritual, cultural and educational needs considered and pt agreeable to plan of care and goals.     Anticipated barriers to physical therapy: pain amplification, chronic nature    Goals:   Short Term Goals: 4 weeks   1. Patient demonstrates independence with HEP. Goal Met  2. Patient demonstrates independence with Postural Awareness while perform seated  activities such as fátima/knitting. Goal Progressing  3. Patient demonstrates independence with body mechanics during sitting. Goal Progressing   4. Patient will improve cervical range of motion by 5 degrees in restricted range of motion. Goal Progressing     Long Term Goals: 8 weeks   1. Patient demonstrates increased cervical range of motion to improve tolerance to functional activities.   2. Patient demonstrates increased strength BUE's to 4/5 or greater to improve tolerance to functional activities. Goal Met  3. Patient demonstrates improved overall function per FOTO to 39% or less.   4. Patient will report pain 2/10 during scanning activities.       Plan     Plan of care Certification: 10/2/2023 to 12/31/2023.     Outpatient Physical Therapy 2 times weekly for 8 weeks to include the following interventions: Cervical/Lumbar Traction, Electrical Stimulation  , Gait Training, Manual Therapy, Moist Heat/ Ice, Neuromuscular Re-ed, Orthotic Management and Training, Patient Education, Self Care, Therapeutic Activities, and Therapeutic Exercise.        Renae Goodell, PTA

## 2023-11-15 ENCOUNTER — CLINICAL SUPPORT (OUTPATIENT)
Dept: REHABILITATION | Facility: HOSPITAL | Age: 68
End: 2023-11-15
Attending: ANESTHESIOLOGY
Payer: MEDICARE

## 2023-11-15 DIAGNOSIS — M53.82 DECREASED RANGE OF MOTION OF INTERVERTEBRAL DISCS OF CERVICAL SPINE: ICD-10-CM

## 2023-11-15 DIAGNOSIS — R29.898 DECREASED STRENGTH OF UPPER EXTREMITY: ICD-10-CM

## 2023-11-15 DIAGNOSIS — M50.30 DDD (DEGENERATIVE DISC DISEASE), CERVICAL: Primary | ICD-10-CM

## 2023-11-15 PROCEDURE — 97110 THERAPEUTIC EXERCISES: CPT | Mod: PN,CQ

## 2023-11-15 PROCEDURE — 97140 MANUAL THERAPY 1/> REGIONS: CPT | Mod: PN,CQ

## 2023-11-27 PROBLEM — I20.89 EQUIVALENT ANGINA: Status: ACTIVE | Noted: 2023-11-27

## 2023-11-27 PROBLEM — R07.9 CHEST PAIN: Status: ACTIVE | Noted: 2023-11-27

## 2023-12-13 PROBLEM — R94.39 ABNORMAL CARDIOVASCULAR STRESS TEST: Status: ACTIVE | Noted: 2023-12-13

## 2024-02-09 PROBLEM — R94.31 PROLONGED Q-T INTERVAL ON ECG: Status: ACTIVE | Noted: 2024-02-09

## 2024-02-09 PROBLEM — Z78.9 FALSE POSITIVE CARDIAC STRESS TEST: Status: ACTIVE | Noted: 2024-02-09

## 2024-02-09 PROBLEM — R94.39 ABNORMAL CARDIOVASCULAR STRESS TEST: Status: RESOLVED | Noted: 2023-12-13 | Resolved: 2024-02-09

## 2024-02-09 PROBLEM — Z91.89 MULTIPLE RISK FACTORS FOR CORONARY ARTERY DISEASE: Status: ACTIVE | Noted: 2024-02-09

## 2024-02-26 ENCOUNTER — OFFICE VISIT (OUTPATIENT)
Dept: NEUROLOGY | Facility: CLINIC | Age: 69
End: 2024-02-26
Payer: MEDICARE

## 2024-02-26 VITALS
BODY MASS INDEX: 48.82 KG/M2 | HEIGHT: 65 IN | SYSTOLIC BLOOD PRESSURE: 142 MMHG | HEART RATE: 78 BPM | WEIGHT: 293 LBS | DIASTOLIC BLOOD PRESSURE: 86 MMHG | RESPIRATION RATE: 18 BRPM

## 2024-02-26 DIAGNOSIS — Z86.73 HISTORY OF TIA (TRANSIENT ISCHEMIC ATTACK): ICD-10-CM

## 2024-02-26 DIAGNOSIS — E66.01 MORBID OBESITY: ICD-10-CM

## 2024-02-26 DIAGNOSIS — R20.0 LEFT FACIAL NUMBNESS: ICD-10-CM

## 2024-02-26 DIAGNOSIS — R20.0 NUMBNESS OF FACE: ICD-10-CM

## 2024-02-26 DIAGNOSIS — G56.01 RIGHT CARPAL TUNNEL SYNDROME: ICD-10-CM

## 2024-02-26 DIAGNOSIS — M54.12 CERVICAL RADICULAR PAIN: ICD-10-CM

## 2024-02-26 DIAGNOSIS — M50.90 CERVICAL DISC DISEASE: Primary | ICD-10-CM

## 2024-02-26 PROCEDURE — 99999 PR PBB SHADOW E&M-EST. PATIENT-LVL V: CPT | Mod: PBBFAC,,, | Performed by: PSYCHIATRY & NEUROLOGY

## 2024-02-26 PROCEDURE — 99214 OFFICE O/P EST MOD 30 MIN: CPT | Mod: S$GLB,,, | Performed by: PSYCHIATRY & NEUROLOGY

## 2024-02-26 NOTE — PROGRESS NOTES
HPI: Loraine Guillen is a 69 y.o. female  with numbness and tingling in the hands        Numbness in the hands continues. She has ortho follow up pending per her    She is wearing her braces    Neck pain seems improved currently    Pain management consult was done since the last visit. She was started on Cymbalta and facet injection may be considered going forward/ she has follow up with pain management pending. She has since discontinued cymbalta    Trigger fingers on the left hand pending follow up with ortho per her/ this is improved      She also has Raynaud's    Facial tingling bilaterally is still noted and occurs in long or short episodes that are clearly provoked by anger /stress but not always. Symptoms are not currently daily but were before. She states the symptoms start on one side of the face and travels to the other side  She has some anxiety but not daily per her    Does not drink alcohol    Review of Systems   Constitutional:  Negative for fever.   HENT:  Negative for nosebleeds.    Eyes:  Negative for double vision.   Respiratory:  Negative for hemoptysis.    Cardiovascular:  Negative for leg swelling.   Gastrointestinal:  Negative for blood in stool.   Genitourinary:  Negative for hematuria.   Musculoskeletal:  Positive for neck pain.   Skin:  Negative for rash.   Neurological:  Positive for sensory change.   Endo/Heme/Allergies:  Does not bruise/bleed easily.         I have reviewed all of this patient's past medical and surgical histories as well as family and social histories and active allergies and medications as documented in the electronic medical record.        Exam:  Gen Appearance, well developed/nourished in no apparent distress  CV: 2+ distal pulses with no edema or swelling  Neuro:  MS: Awake, alert, Sustains attention. Recent/remote memory intact, Language is full to spontaneous speech/comprehension. Fund of Knowledge is full  CN: Optic discs are flat with normal vasculature,  PERRL, Extraoccular movements and visual fields are full. Normal facial sensation (splits the tuning fork on the forehead) and strength is normal, Hearing symmetric, Tongue and Palate are midline and strong. Shoulder Shrug symmetric and strong.  Motor: Normal bulk, tone, no abnormal movements. 5/5 strength bilateral upper/lower extremities with 1+ reflexes and no clonus  Sensory: symmetric to  temp, and vibration, romberg negative  Cerebellar: Finger-nose,Heal-shin, Rapid alternating movements intact  Gait: Normal stance, no ataxia      Imaging:    CT C spine 8/2022:   Disc degenerative change including posterior spondylosis and/or bulging of the C3-4, C4-5 and C5-6 discs with uncinate hypertrophy and foraminal narrowing as detailed above.  Mild left facet arthropathy C6-7.    8/2022 CT head: No acute intracranial hemorrhage or acute calvarial fracture; normal CT of head.    2023 EMG/NCS of the arms:      Limited study due to poor patient tolerance. Suggestion of minimal right carpal tunnel syndrome is noted. No clear CTS on the left. The procedure was too limited to make any further diagnostic conclusions    8/2023 MRI C spine: Disc-osteophyte C2-3, C3-4, and C4-5 with contact with the anterior cord.     Labs:    1/2023 CMP normal  CBC chronic anemia      Assessment/Plan: Loraine Guillen is a 69 y.o. female with numbness and tingling in the hands for the past year  I recommend:     2023 EMG/NCS  of the arms: Limited study due to poor patient tolerance. Suggestion of minimal right carpal tunnel syndrome is noted. No clear CTS on the left. The procedure was too limited to make any further diagnostic conclusions  -Brace wearing helps somewhat  -Due to her neck pain and degenerative changes by CT and lack of explanation for left hand numbness by the limited study above: MRI C spine was ordered and showed disc/osteophyte changes with contact with the anterior cord  -pain management follow up ongoing for this  -She  "does see ortho for  + trigger fingers on the left hand    2. Note she  also has Raynaud's per rheumatology/ no other rheumatologic disorders noted    3. Has a history of some vague facial tingling with stress.   -However she described some ? "amaurosis fugax: symptoms to opthalmology prior.   CT head and Carotid US have been negative for the past few years   -Symptoms travel from one side of the face then the other not following a vascular pattern. Offered treatment to try to prevent spells.Exam shows some functional symptoms. She did not tolerate gabapentin prior due to weight gain and did not tolerate Cymbalta.. She is pending consideration of weight loss meds.  Offered an anti-anxiety medication trial but she declines until more  weight loss. Can try deep breathing exercises instead    RTC 6 months          "

## 2024-02-27 PROBLEM — R55 SYNCOPE: Status: RESOLVED | Noted: 2020-12-21 | Resolved: 2024-02-27

## 2024-02-27 PROBLEM — L03.113 CELLULITIS OF RIGHT FOREARM: Status: RESOLVED | Noted: 2020-01-02 | Resolved: 2024-02-27

## 2024-02-27 PROBLEM — R30.0 DYSURIA: Status: RESOLVED | Noted: 2017-12-04 | Resolved: 2024-02-27

## 2024-02-27 PROBLEM — R07.9 CHEST PAIN: Status: RESOLVED | Noted: 2023-11-27 | Resolved: 2024-02-27

## 2024-02-27 PROBLEM — R20.0 LEFT FACIAL NUMBNESS: Status: RESOLVED | Noted: 2020-12-30 | Resolved: 2024-02-27

## 2024-02-27 PROBLEM — R29.898 DECREASED STRENGTH OF UPPER EXTREMITY: Status: RESOLVED | Noted: 2023-10-02 | Resolved: 2024-02-27

## 2024-02-27 PROBLEM — Z71.89 CPAP USE COUNSELING: Status: RESOLVED | Noted: 2022-07-26 | Resolved: 2024-02-27

## 2024-02-27 PROBLEM — R06.09 DYSPNEA ON EXERTION: Status: RESOLVED | Noted: 2020-09-03 | Resolved: 2024-02-27

## 2024-02-27 PROBLEM — M65.342 TRIGGER RING FINGER OF LEFT HAND: Status: RESOLVED | Noted: 2022-07-26 | Resolved: 2024-02-27

## 2024-02-27 PROBLEM — K62.5 BRIGHT RED BLOOD PER RECTUM: Status: RESOLVED | Noted: 2018-05-23 | Resolved: 2024-02-27

## 2024-02-27 PROBLEM — Z12.11 SCREENING FOR COLON CANCER: Status: RESOLVED | Noted: 2018-08-17 | Resolved: 2024-02-27

## 2024-02-27 PROBLEM — Z91.89 MULTIPLE RISK FACTORS FOR CORONARY ARTERY DISEASE: Status: RESOLVED | Noted: 2024-02-09 | Resolved: 2024-02-27

## 2024-03-05 ENCOUNTER — OFFICE VISIT (OUTPATIENT)
Dept: PAIN MEDICINE | Facility: CLINIC | Age: 69
End: 2024-03-05
Payer: MEDICARE

## 2024-03-05 VITALS — BODY MASS INDEX: 48.82 KG/M2 | WEIGHT: 293 LBS | HEIGHT: 65 IN

## 2024-03-05 DIAGNOSIS — M47.812 ARTHROPATHY OF CERVICAL FACET JOINT: Primary | ICD-10-CM

## 2024-03-05 DIAGNOSIS — M50.30 DDD (DEGENERATIVE DISC DISEASE), CERVICAL: ICD-10-CM

## 2024-03-05 PROCEDURE — 99214 OFFICE O/P EST MOD 30 MIN: CPT | Mod: S$GLB,,, | Performed by: ANESTHESIOLOGY

## 2024-03-05 PROCEDURE — 99999 PR PBB SHADOW E&M-EST. PATIENT-LVL III: CPT | Mod: PBBFAC,,, | Performed by: ANESTHESIOLOGY

## 2024-03-05 NOTE — PROGRESS NOTES
[facility] EST Patient Evaluation  Loraine Guillen  : 1955  Date: 3/5/2024     CHIEF COMPLAINT:  No chief complaint on file.    Referring Physician No ref. provider found    Primary Care Physician Rubén Jean MD    HPI:  This is a 69 y.o. female with a chief complaint of No chief complaint on file.  . 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      Patient was seen and evaluated by Neurology team for tingling bilateral hand, patient had a recommendation for MRI cervical spine that showed multiple level disc osteophyte contacting the anterior cord with multiple level of mild-to-moderate bilateral neural foramina narrowing most pronounced at C3-C4 C4-C5, C5-C6 and C6-C7 with no abnormal signal within the cervical cord.  She sees rheumatologist for Raynaud last seen was in May 2023    Patient had EMG study 2023 suggested minimal right carpal tunnel syndrome, no clear on the left however the procedure was to limited to make any further diagnostic conclusion due to lack of patient tolerance.      Diabetic: No    Anticogualtion drugs: Plavix for mini stroke she has a rash from aspirin    Interval History 10/26/2023:  Loraine Guillen is here for follow up visit.  Patient has completed 6 sessions of physical therapy, patient reported significant improvement in pain and functionality after the physical therapy, her current pain score is 2/10, could not tolerate Lyrica due to side effects, could not afford compound cream or lidocaine patches 5%.    Interval History 2024:  Loraine Guillen is here for follow up visit. ***      Current Description of Pain Symptoms:  Pain  started: 01/2023  Bilateral Occiptal hedache, Neck   Radiates to the Kody Ues above the elbow intermittently, with numbness in BL Hand.   Associated symptoms:  Bilateral numbness in lower extremities mainly lateral aspect of the thigh  Pain is Getting worse over the last 3 months    The pain is described as numbing.   Exacerbating factors: Turning side to side  Mitigating factors heat and medications.   Symptoms interfere with daily activity.   The patient feels like symptoms have been worsening.   Patient denies night fever/night sweats, urinary incontinence, bowel incontinence, significant weight loss, significant motor weakness, and loss of sensations.    Pain score:   Current: Pain is ***/10    Current pain medications:      acetaminophen (TYLENOL) 650 MG PRN    Current Opioid Pain Medication:  Opioids- None    UDS  NA    PDMP    Reviewed and consistent with medication use as prescribed.     Pain Treatment History:    Physical Therapy/HEP/Physician Lead exercise program:  She did 6 session, great help    Non-interventional Pain Therapy:  Chiropractor:  Acupuncture:  TENS unit:  Heat/ICE:  Back Brace:    Medications previously tried:  NSAIDs: None  Topical Agent: yes  TCA/SSRI/SNRI: None  Anti-convulsants: Gabapentin  and Lyrica  SE increase hear moeller   Muscle Relaxants: None  Opioids- None  Benzodiazepines: No    Interventional Pain Procedures:   NA    Previous spine/joint surgery:   No    Surgical History:   has a past surgical history that includes Tubal ligation;  Gallstones removed; Cardiac catheterization; Hysterectomy (09/22/14); Oophorectomy (09/22/14); Colonoscopy (N/A, 8/17/2018); and Left heart catheterization (Left, 12/29/2023).    Medical History     has a past medical history of Anemia, Arthritis, CAD (coronary artery disease), Cataract, CVA (cerebral infarction), Encounter for screening colonoscopy (8/18/2014), Fever blister, GERD (gastroesophageal reflux disease), Glaucoma, Glaucoma suspect of both  eyes, H/O amaurosis fugax, Hyperlipemia, Hypertension, Joint pain, and Sleep apnea.    Family History:  family history includes Breast cancer in her maternal aunt; Diabetes in her brother; Heart attack in her brother; Heart disease in her brother and mother; Hypertension in her brother, father, mother, and sister; Obesity in her sister; Stroke in her father.    Allergies:  Aspirin, Clindamycin, Metoprolol tartrate, Ranexa [ranolazine], Strawberry, and Tramadol     Medications  Current Outpatient Medications   Medication Sig    acetaminophen (TYLENOL) 650 MG TbSR Take 650 mg by mouth every 8 (eight) hours.    albuterol (PROAIR HFA) 90 mcg/actuation inhaler INHALE 2 PUFFS INTO THE LUNGS 4 (FOUR) TIMES DAILY AS NEEDED.    alendronate (FOSAMAX) 10 MG Tab Take 1 tablet (10 mg total) by mouth once daily.    alirocumab (PRALUENT PEN) 150 mg/mL PnIj Inject 2 mL (300 mg total) into the skin as instructed (once a month).    ammonium lactate (LAC-HYDRIN) 12 % lotion Apply topically 3 (three) times daily. Apply to whole body    ascorbic acid, vitamin C, (VITAMIN C) 1000 MG tablet Take 3,000 mg by mouth once daily.    atorvastatin (LIPITOR) 80 MG tablet Take 1 tablet (80 mg total) by mouth every evening.    calcium carbonate (OS-KODY) 500 mg calcium (1,250 mg) tablet Take 1 tablet (500 mg total) by mouth once daily.    carvediloL (COREG) 12.5 MG tablet Take 1 tablet (12.5 mg total) by mouth 2 (two) times daily with meals.    cholecalciferol, vitamin D3, (VITAMIN D3) 25 mcg (1,000 unit) capsule Take 3,000 Units by mouth once daily.    clobetasoL (TEMOVATE) 0.05 % cream Apply topically 2 (two) times daily. Apply small amount to left leg only .    clopidogreL (PLAVIX) 75 mg tablet Take 1 tablet (75 mg total) by mouth once daily.    docusate sodium (COLACE) 100 MG capsule Take 1 capsule (100 mg total) by mouth 2 (two) times daily. (Patient taking differently: Take 100 mg by mouth 2 (two) times daily as needed.)    ezetimibe (ZETIA)  "10 mg tablet Take 1 tablet (10 mg total) by mouth once daily.    famotidine (PEPCID) 40 MG tablet Take 1 tablet (40 mg total) by mouth once daily.    fluticasone furoate-vilanteroL (BREO ELLIPTA) 200-25 mcg/dose DsDv diskus inhaler Inhale 1 puff into the lungs once daily. Controller    furosemide (LASIX) 20 MG tablet Take 1 tablet (20 mg total) by mouth daily as needed (edema, fluid).    irbesartan (AVAPRO) 150 MG tablet Take 1 tablet (150 mg total) by mouth every evening.    latanoprost (XALATAN) 0.005 % ophthalmic solution Place 1 drop into both eyes once daily.    montelukast (SINGULAIR) 10 mg tablet Take 1 tablet (10 mg total) by mouth nightly.    mv-min-folic-calcium carb-K1 400 mcg-500 mg calcium-20 mcg Tab Take 1 tablet by mouth once daily.    nitroGLYCERIN (NITROSTAT) 0.4 MG SL tablet Place 1 tablet (0.4 mg total) under the tongue every 5 (five) minutes as needed for Chest pain. PLACE 1 TABLET (0.4 MG TOTAL) UNDER THE TONGUE EVERY 5 (FIVE) MINUTES AS NEEDED FOR CHEST PAIN.    RESTASIS 0.05 % ophthalmic emulsion Place 1 drop into both eyes 2 (two) times daily.    solifenacin (VESICARE) 5 MG tablet Take 1 tablet (5 mg total) by mouth once daily.    vitamin E 100 UNIT capsule Take 100 Units by mouth once daily. Pt states she takes "2000" units daily     No current facility-administered medications for this visit.      Social History/SUBSTANCE ABUSE HISTORY:  Personal history of substance abuse: No     reports that she has never smoked. She has never used smokeless tobacco. She reports current alcohol use. She reports that she does not use drugs.    LABS:  CBC  Lab Results   Component Value Date    WBC 2.88 (L) 12/13/2023    HGB 11.4 (L) 12/13/2023    HCT 33.9 (L) 12/13/2023    MCV 80 (L) 12/13/2023     12/13/2023     CMP  Sodium   Date Value Ref Range Status   12/13/2023 143 136 - 145 mmol/L Final     Potassium   Date Value Ref Range Status   12/13/2023 3.9 3.5 - 5.1 mmol/L Final     Chloride   Date " Value Ref Range Status   12/13/2023 109 95 - 110 mmol/L Final     CO2   Date Value Ref Range Status   12/13/2023 23 23 - 29 mmol/L Final     Glucose   Date Value Ref Range Status   12/13/2023 95 70 - 110 mg/dL Final     BUN   Date Value Ref Range Status   12/13/2023 13 8 - 23 mg/dL Final     Creatinine   Date Value Ref Range Status   12/13/2023 0.7 0.5 - 1.4 mg/dL Final     Calcium   Date Value Ref Range Status   12/13/2023 9.0 8.7 - 10.5 mg/dL Final     Total Protein   Date Value Ref Range Status   12/13/2023 6.6 6.0 - 8.4 g/dL Final     Albumin   Date Value Ref Range Status   12/13/2023 3.6 3.5 - 5.2 g/dL Final     Total Bilirubin   Date Value Ref Range Status   12/13/2023 0.5 0.1 - 1.0 mg/dL Final     Comment:     For infants and newborns, interpretation of results should be based  on gestational age, weight and in agreement with clinical  observations.    Premature Infant recommended reference ranges:  Up to 24 hours.............<8.0 mg/dL  Up to 48 hours............<12.0 mg/dL  3-5 days..................<15.0 mg/dL  6-29 days.................<15.0 mg/dL       Alkaline Phosphatase   Date Value Ref Range Status   12/13/2023 44 (L) 55 - 135 U/L Final     AST   Date Value Ref Range Status   12/13/2023 14 10 - 40 U/L Final     ALT   Date Value Ref Range Status   12/13/2023 11 10 - 44 U/L Final     Anion Gap   Date Value Ref Range Status   12/13/2023 11 8 - 16 mmol/L Final     eGFR if    Date Value Ref Range Status   06/21/2022 >60.0 >60 mL/min/1.73 m^2 Final     eGFR if non    Date Value Ref Range Status   06/21/2022 >60.0 >60 mL/min/1.73 m^2 Final     Comment:     Calculation used to obtain the estimated glomerular filtration  rate (eGFR) is the CKD-EPI equation.        HGBA1C  Lab Results   Component Value Date    HGBA1C 5.3 10/20/2020     ROS:    Review of Systems   Musculoskeletal:  Positive for neck pain and neck stiffness.   Neurological:  Positive for numbness and headaches.      GENERAL:  No weight loss, malaise or fevers.  HEENT:   No recent changes in vision or hearing  NECK:  Negative for lumps, no difficulty with swallowing.  RESPIRATORY:  Negative for cough, wheezing or shortness of breath, patient denies any recent URI.  CARDIOVASCULAR:  Negative for chest pain, leg swelling or palpitations.  GI:  Negative for abdominal discomfort, blood in stools or black stools or change in bowel habits.  MUSCULOSKELETAL:  See HPI.  SKIN:  Negative for lesions, rash, and itching.  PSYCH:  No mood disorder or recent psychosocial stressors.   HEMATOLOGY/LYMPHOLOGY:  Positive for prolonged bleeding, bruising easily or swollen nodes.  Patient is n currently taking anti-coagulants  NEURO:  See HPI  All other reviewed and negative other than HPI.    PHYSICAL EXAM:    VITALS: LMP  (LMP Unknown)   There is no height or weight on file to calculate BMI.    GENERAL: Well appearing, in no acute distress, alert and oriented x3, answers questions appropriately.   PSYCH: Flat affect.    SKIN: Skin color, texture, turgor normal, no rashes or lesions.  HEAD/FACE:  Normocephalic, atraumatic. Cranial nerves grossly intact.  CV: Regular rate  PULM: No evidence of respiratory difficulty, symmetric chest rise.  GI:  Soft and non-Distended.    NECK: (+) pain to palpation over the cervical paraspinous muscles. Spurling: +. (+) pain with neck flexion, extension, or lateral flexion, Muscle strength in RT UE 55/5 and Left UE 5/5, Hand  5/5, Mary test is negative bilaterally.    Hip Exam:      Inspection: No gross deformity or apparent leg length discrepancy      Palpation:  No TTP to bilateral greater trochanteric bursas.       ROM:  No limitation or pain in internal rotation, external rotation b/l  Neurologic Exam:     Alert. Speech is fluent and appropriate.     Strength: 5/5 in right hip flexion and knee extension, otherwise 5/5 throughout bilateral lower extremities     Sensation:  Grossly intact to light touch  in bilateral lower extremities     Tone: No abnormality appreciated in bilateral lower extremities     No Clonus    GAIT:  Stable    DIAGNOSTIC STUDIES AND MEDICAL RECORDS REVIEW:        I have personally reviewed and interpreted relevant radiology reports and reviewed relevant records from other services in the EMR.      EMG 08/2023     Impression:  Limited study due to poor patient tolerance. Suggestion of minimal right carpal tunnel syndrome is noted. No clear CTS on the left. The procedure was too limited to make any further diagnostic conclusions       MRI CERVICAL SPINE WITHOUT CONTRAST August 2023        FINDINGS:  Reversal the normal lordosis.  Mild multilevel disc desiccation and loss of disc height, most pronounced C3-4.     C2-3: Broad-based disc-osteophyte with contact with the anterior cord.     C3-4: Broad-based disc-osteophyte with contact with the anterior cord.  Uncinate hypertrophy with moderate bilateral foraminal stenosis.     C4-5: Broad-based disc-osteophyte with contact with the anterior cord.  Uncinate hypertrophy with mild bilateral foraminal stenosis.     C5-6: No significant disc bulge.  Uncinate hypertrophy with mild bilateral foraminal stenosis.     C6-7: No significant disc bulge.  Uncinate hypertrophy with mild left foraminal stenosis.     C7-T1: No significant disc bulge.     No abnormal signal within the cervical cord.  No abnormality seen within the visualized portions of the posterior fossa.  No vertebral body marrow edema noted.     Impression:     Disc-osteophyte C2-3, C3-4, and C4-5 with contact with the anterior cord.    ASSESSMENT AND PLAN:  Loraine Guillen is a 69 y.o. female with the following diagnoses based on history, exam, and imaging:    No diagnosis found.    This is a pleasant 69 y.o. lady presenting with neck pain, bilateral occipital headache, bilateral hand numbness started around January 2023 that has been worsening over the last 2 or 3 months, pain is worse  with turning side-to-side, on physical examination she has tenderness point over the cervical paraspinal muscle bilaterally, negative Mary's sign, positive Spurling's test, handgrip is 5/5.    Has significant improvement from physical therapy, she could not tolerate Lyrica, she could not afford lidocaine patch or compound cream  Patient has cervical facet arthropathy in addition to some element with carpal tunnel syndrome, I do not believe that she have cervical radiculopathy based on the MRI results with the cervical spine on August 2023, EMG/nerve conduction study was inconclusive      Treatment Plan   1-Diagnostics/Referrals:  Continue  PT for neck pain     2-Medications:  NSAIDs: None  Topical Agent:  Voltaren gel  TCA/SSRI/SNRI:  Start Cymbalta 30 mg q.h.s., prescription was provided for 1 month  Anti-convulsants: Lyrica  she had SE: heat racing   Muscle Relaxants: None  Opioids- None    3-Interventional Therapy: NA    4-Physical Rehabilitation: continue Referral to Physical therapy for Cervical ROM, stretching, strengthening, and a home exercise program.    5-Patient Education Counseled patient regarding the importance of activity modification and physical therapy    6-Follow-up: RTC 2 months    May consider:  Cervical facet injection(she prefers no injection), increasing Cymbalta, considering different muscle relaxants      Violetta Mcgraw MD  Interventional Pain Medicine  03/05/2024    Disclaimer:  This note was prepared using voice recognition system and is likely to have sound alike errors that may have been overlooked even after proof reading.  Please call me with any questions.

## 2024-03-05 NOTE — PROGRESS NOTES
[facility] EST Patient Evaluation  Loraine Guillen  : 1955  Date: 3/5/2024     CHIEF COMPLAINT:  Neck Pain      Primary Care Physician Rubén Jean MD    HPI:  This is a 69 y.o. female with a chief complaint of Neck Pain  . 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      Patient was seen and evaluated by Neurology team for tingling bilateral hand, patient had a recommendation for MRI cervical spine that showed multiple level disc osteophyte contacting the anterior cord with multiple level of mild-to-moderate bilateral neural foramina narrowing most pronounced at C3-C4 C4-C5, C5-C6 and C6-C7 with no abnormal signal within the cervical cord.  She sees rheumatologist for Raynaud last seen was in May 2023    Patient had EMG study 2023 suggested minimal right carpal tunnel syndrome, no clear on the left however the procedure was to limited to make any further diagnostic conclusion due to lack of patient tolerance.      Diabetic: No    Anticogualtion drugs: Plavix for mini stroke she has a rash from aspirin    Interval History 10/26/2023:  Loraine Guillen is here for follow up visit.  Patient has completed 6 sessions of physical therapy, patient reported significant improvement in pain and functionality after the physical therapy, her current pain score is 2/10, could not tolerate Lyrica due to side effects, could not afford compound cream or lidocaine patches 5%.    Interval History 2024:  Loraine Guillen is here for follow up visit. She could not tolerate Cymbalta so she takes tylenol as needed for pain.    Current Description of Pain Symptoms similar to previous  :  Pain started: 01/2023  Bilateral Occiptal hedache, Neck   Radiates to the Kody Ues above the elbow intermittently, with numbness in BL Hand.   Associated symptoms:  Bilateral numbness in lower extremities mainly lateral aspect of the thigh  Pain is Getting worse over the last 3 months    The pain is described as numbing.   Exacerbating factors: Turning side to side  Mitigating factors heat and medications.   Symptoms interfere with daily activity.   The patient feels like symptoms have been Improving.   Patient denies night fever/night sweats, urinary incontinence, bowel incontinence, significant weight loss, significant motor weakness, and loss of sensations.    Pain score:   Current: Pain is 0/10    Current pain medications:      acetaminophen (TYLENOL) 650 MG PRN    Current Opioid Pain Medication:  Opioids- None she takes Norco from her friend as needed -showed advised against that    UDS  NA    PDMP    Reviewed and consistent with medication use as prescribed.     Pain Treatment History:    Physical Therapy/HEP/Physician Lead exercise program:  She did 6 session, great help    Non-interventional Pain Therapy:  Chiropractor:  Acupuncture:  TENS unit:  Heat/ICE:  Back Brace:    Medications previously tried:  NSAIDs: None  Topical Agent: yes  TCA/SSRI/SNRI:  Cymbalta : could not tolerate  Anti-convulsants: Gabapentin  and Lyrica  SE increase heart rate  Muscle Relaxants: None  Opioids- None  Benzodiazepines: No    Interventional Pain Procedures:   NA    Previous spine/joint surgery:   No    Surgical History:   has a past surgical history that includes Tubal ligation;  Gallstones removed; Cardiac catheterization; Hysterectomy (09/22/14); Oophorectomy (09/22/14); Colonoscopy (N/A, 8/17/2018); and Left heart catheterization (Left, 12/29/2023).    Medical History     has a past medical history of Anemia, Arthritis, CAD (coronary artery disease), Cataract, CVA (cerebral infarction), Encounter for screening colonoscopy  (8/18/2014), Fever blister, GERD (gastroesophageal reflux disease), Glaucoma, Glaucoma suspect of both eyes, H/O amaurosis fugax, Hyperlipemia, Hypertension, Joint pain, and Sleep apnea.    Family History:  family history includes Breast cancer in her maternal aunt; Diabetes in her brother; Heart attack in her brother; Heart disease in her brother and mother; Hypertension in her brother, father, mother, and sister; Obesity in her sister; Stroke in her father.    Allergies:  Aspirin, Clindamycin, Metoprolol tartrate, Ranexa [ranolazine], Strawberry, and Tramadol     Medications  Current Outpatient Medications   Medication Sig    acetaminophen (TYLENOL) 650 MG TbSR Take 650 mg by mouth every 8 (eight) hours.    albuterol (PROAIR HFA) 90 mcg/actuation inhaler INHALE 2 PUFFS INTO THE LUNGS 4 (FOUR) TIMES DAILY AS NEEDED.    alirocumab (PRALUENT PEN) 150 mg/mL PnIj Inject 2 mL (300 mg total) into the skin as instructed (once a month).    ammonium lactate (LAC-HYDRIN) 12 % lotion Apply topically 3 (three) times daily. Apply to whole body    ascorbic acid, vitamin C, (VITAMIN C) 1000 MG tablet Take 3,000 mg by mouth once daily.    atorvastatin (LIPITOR) 80 MG tablet Take 1 tablet (80 mg total) by mouth every evening.    carvediloL (COREG) 12.5 MG tablet Take 1 tablet (12.5 mg total) by mouth 2 (two) times daily with meals.    cholecalciferol, vitamin D3, (VITAMIN D3) 25 mcg (1,000 unit) capsule Take 3,000 Units by mouth once daily.    clobetasoL (TEMOVATE) 0.05 % cream Apply topically 2 (two) times daily. Apply small amount to left leg only .    clopidogreL (PLAVIX) 75 mg tablet Take 1 tablet (75 mg total) by mouth once daily.    ezetimibe (ZETIA) 10 mg tablet Take 1 tablet (10 mg total) by mouth once daily.    famotidine (PEPCID) 40 MG tablet Take 1 tablet (40 mg total) by mouth once daily.    fluticasone furoate-vilanteroL (BREO ELLIPTA) 200-25 mcg/dose DsDv diskus inhaler Inhale 1 puff into the lungs once daily.  "Controller    furosemide (LASIX) 20 MG tablet Take 1 tablet (20 mg total) by mouth daily as needed (edema, fluid).    irbesartan (AVAPRO) 150 MG tablet Take 1 tablet (150 mg total) by mouth every evening.    montelukast (SINGULAIR) 10 mg tablet Take 1 tablet (10 mg total) by mouth nightly.    mv-min-folic-calcium carb-K1 400 mcg-500 mg calcium-20 mcg Tab Take 1 tablet by mouth once daily.    RESTASIS 0.05 % ophthalmic emulsion Place 1 drop into both eyes 2 (two) times daily.    solifenacin (VESICARE) 5 MG tablet Take 1 tablet (5 mg total) by mouth once daily.    vitamin E 100 UNIT capsule Take 100 Units by mouth once daily. Pt states she takes "2000" units daily    alendronate (FOSAMAX) 10 MG Tab Take 1 tablet (10 mg total) by mouth once daily.    calcium carbonate (OS-KODY) 500 mg calcium (1,250 mg) tablet Take 1 tablet (500 mg total) by mouth once daily.    docusate sodium (COLACE) 100 MG capsule Take 1 capsule (100 mg total) by mouth 2 (two) times daily. (Patient taking differently: Take 100 mg by mouth 2 (two) times daily as needed.)    latanoprost (XALATAN) 0.005 % ophthalmic solution Place 1 drop into both eyes once daily.    nitroGLYCERIN (NITROSTAT) 0.4 MG SL tablet Place 1 tablet (0.4 mg total) under the tongue every 5 (five) minutes as needed for Chest pain. PLACE 1 TABLET (0.4 MG TOTAL) UNDER THE TONGUE EVERY 5 (FIVE) MINUTES AS NEEDED FOR CHEST PAIN.     No current facility-administered medications for this visit.      Social History/SUBSTANCE ABUSE HISTORY:  Personal history of substance abuse: No     reports that she has never smoked. She has never used smokeless tobacco. She reports current alcohol use. She reports that she does not use drugs.    LABS:  CBC  Lab Results   Component Value Date    WBC 2.88 (L) 12/13/2023    HGB 11.4 (L) 12/13/2023    HCT 33.9 (L) 12/13/2023    MCV 80 (L) 12/13/2023     12/13/2023     CMP  Sodium   Date Value Ref Range Status   12/13/2023 143 136 - 145 mmol/L Final "     Potassium   Date Value Ref Range Status   12/13/2023 3.9 3.5 - 5.1 mmol/L Final     Chloride   Date Value Ref Range Status   12/13/2023 109 95 - 110 mmol/L Final     CO2   Date Value Ref Range Status   12/13/2023 23 23 - 29 mmol/L Final     Glucose   Date Value Ref Range Status   12/13/2023 95 70 - 110 mg/dL Final     BUN   Date Value Ref Range Status   12/13/2023 13 8 - 23 mg/dL Final     Creatinine   Date Value Ref Range Status   12/13/2023 0.7 0.5 - 1.4 mg/dL Final     Calcium   Date Value Ref Range Status   12/13/2023 9.0 8.7 - 10.5 mg/dL Final     Total Protein   Date Value Ref Range Status   12/13/2023 6.6 6.0 - 8.4 g/dL Final     Albumin   Date Value Ref Range Status   12/13/2023 3.6 3.5 - 5.2 g/dL Final     Total Bilirubin   Date Value Ref Range Status   12/13/2023 0.5 0.1 - 1.0 mg/dL Final     Comment:     For infants and newborns, interpretation of results should be based  on gestational age, weight and in agreement with clinical  observations.    Premature Infant recommended reference ranges:  Up to 24 hours.............<8.0 mg/dL  Up to 48 hours............<12.0 mg/dL  3-5 days..................<15.0 mg/dL  6-29 days.................<15.0 mg/dL       Alkaline Phosphatase   Date Value Ref Range Status   12/13/2023 44 (L) 55 - 135 U/L Final     AST   Date Value Ref Range Status   12/13/2023 14 10 - 40 U/L Final     ALT   Date Value Ref Range Status   12/13/2023 11 10 - 44 U/L Final     Anion Gap   Date Value Ref Range Status   12/13/2023 11 8 - 16 mmol/L Final     eGFR if    Date Value Ref Range Status   06/21/2022 >60.0 >60 mL/min/1.73 m^2 Final     eGFR if non    Date Value Ref Range Status   06/21/2022 >60.0 >60 mL/min/1.73 m^2 Final     Comment:     Calculation used to obtain the estimated glomerular filtration  rate (eGFR) is the CKD-EPI equation.        HGBA1C  Lab Results   Component Value Date    HGBA1C 5.3 10/20/2020     ROS:    Review of Systems  "  Musculoskeletal:  Positive for neck pain and neck stiffness.   Neurological:  Positive for numbness and headaches.     GENERAL:  No weight loss, malaise or fevers.  HEENT:   No recent changes in vision or hearing  NECK:  Negative for lumps, no difficulty with swallowing.  RESPIRATORY:  Negative for cough, wheezing or shortness of breath, patient denies any recent URI.  CARDIOVASCULAR:  Negative for chest pain, leg swelling or palpitations.  GI:  Negative for abdominal discomfort, blood in stools or black stools or change in bowel habits.  MUSCULOSKELETAL:  See HPI.  SKIN:  Negative for lesions, rash, and itching.  PSYCH:  No mood disorder or recent psychosocial stressors.   HEMATOLOGY/LYMPHOLOGY:  Positive for prolonged bleeding, bruising easily or swollen nodes.  Patient is n currently taking anti-coagulants  NEURO:  See HPI  All other reviewed and negative other than HPI.    PHYSICAL EXAM:    VITALS: Ht 5' 5" (1.651 m)   Wt (!) 138.3 kg (305 lb)   LMP  (LMP Unknown)   BMI 50.75 kg/m²   Body mass index is 50.75 kg/m².    GENERAL: Well appearing, in no acute distress, alert and oriented x3, answers questions appropriately.   PSYCH: Flat affect.    SKIN: Skin color, texture, turgor normal, no rashes or lesions.  HEAD/FACE:  Normocephalic, atraumatic. Cranial nerves grossly intact.  CV: Regular rate  PULM: No evidence of respiratory difficulty, symmetric chest rise.  GI:  Soft and non-Distended.    NECK: (+) pain to palpation over the cervical paraspinous muscles. Spurling: +. (+) pain with neck flexion, extension, or lateral flexion, Muscle strength in RT UE 55/5 and Left UE 5/5, Hand  5/5, Mary test is negative bilaterally.    Hip Exam:      Inspection: No gross deformity or apparent leg length discrepancy      Palpation:  No TTP to bilateral greater trochanteric bursas.       ROM:  No limitation or pain in internal rotation, external rotation b/l  Neurologic Exam:     Alert. Speech is fluent and " appropriate.     Strength: 5/5 in right hip flexion and knee extension, otherwise 5/5 throughout bilateral lower extremities     Sensation:  Grossly intact to light touch in bilateral lower extremities     Tone: No abnormality appreciated in bilateral lower extremities     No Clonus    GAIT:  Stable    DIAGNOSTIC STUDIES AND MEDICAL RECORDS REVIEW:        I have personally reviewed and interpreted relevant radiology reports and reviewed relevant records from other services in the EMR.      EMG 2023     Impression:  Limited study due to poor patient tolerance. Suggestion of minimal right carpal tunnel syndrome is noted. No clear CTS on the left. The procedure was too limited to make any further diagnostic conclusions       MRI CERVICAL SPINE WITHOUT CONTRAST 2023        FINDINGS:  Reversal the normal lordosis.  Mild multilevel disc desiccation and loss of disc height, most pronounced C3-4.     C2-3: Broad-based disc-osteophyte with contact with the anterior cord.     C3-4: Broad-based disc-osteophyte with contact with the anterior cord.  Uncinate hypertrophy with moderate bilateral foraminal stenosis.     C4-5: Broad-based disc-osteophyte with contact with the anterior cord.  Uncinate hypertrophy with mild bilateral foraminal stenosis.     C5-6: No significant disc bulge.  Uncinate hypertrophy with mild bilateral foraminal stenosis.     C6-7: No significant disc bulge.  Uncinate hypertrophy with mild left foraminal stenosis.     C7-T1: No significant disc bulge.     No abnormal signal within the cervical cord.  No abnormality seen within the visualized portions of the posterior fossa.  No vertebral body marrow edema noted.     Impression:     Disc-osteophyte C2-3, C3-4, and C4-5 with contact with the anterior cord.    ASSESSMENT AND PLAN:  Loraine Guillen is a 69 y.o. female with the following diagnoses based on history, exam, and imagin. Arthropathy of cervical facet joint        2. DDD  (degenerative disc disease), cervical            This is a pleasant 69 y.o. lady presenting with neck pain, bilateral occipital headache, bilateral hand numbness started around January 2023 that has been worsening over the last 2 or 3 months, pain is worse with turning side-to-side, on physical examination she has tenderness point over the cervical paraspinal muscle bilaterally, negative Mary's sign, positive Spurling's test, handgrip is 5/5.    Has significant improvement from physical therapy, she could not tolerate Lyrica or Cymbalta, she could not afford lidocaine patch or compound cream, currently she does home exercise with good benefit  Patient has cervical facet arthropathy in addition to some element with carpal tunnel syndrome, I do not believe that she have cervical radiculopathy based on the MRI results with the cervical spine on August 2023, EMG/nerve conduction study was inconclusive    Treatment Plan   1-Diagnostics/Referrals:  Continue  home PT    2-Medications:  NSAIDs: None  Topical Agent:  Voltaren gel  TCA/SSRI/SNRI:  DC Cymbalta  Anti-convulsants:  DC Lyrica    Muscle Relaxants: None  Opioids- None    3-Interventional Therapy: NA    4-Physical Rehabilitation: continue Referral to Physical therapy for Cervical ROM, stretching, strengthening, and a home exercise program.    5-Patient Education Counseled patient regarding the importance of activity modification and physical therapy    6-Follow-up: RTC as needed    May consider:  Cervical facet injection(she prefers no injection)    Violetta Mcgraw MD  Interventional Pain Medicine  03/05/2024    Disclaimer:  This note was prepared using voice recognition system and is likely to have sound alike errors that may have been overlooked even after proof reading.  Please call me with any questions.

## 2024-06-05 PROBLEM — L03.115 CELLULITIS OF RIGHT LOWER EXTREMITY: Status: ACTIVE | Noted: 2024-06-05

## 2024-06-06 PROBLEM — I10 HTN (HYPERTENSION): Status: ACTIVE | Noted: 2024-06-06

## 2024-06-06 PROBLEM — M79.9 LESION OF SOFT TISSUE OF LOWER LEG AND ANKLE: Status: ACTIVE | Noted: 2024-06-06

## 2024-06-11 PROBLEM — R06.09 DYSPNEA ON EXERTION: Status: ACTIVE | Noted: 2024-06-11

## 2024-06-11 PROBLEM — Z71.89 CPAP USE COUNSELING: Status: ACTIVE | Noted: 2024-06-11

## 2024-06-11 PROBLEM — I34.0 NONRHEUMATIC MITRAL VALVE REGURGITATION: Status: ACTIVE | Noted: 2024-06-11

## 2024-06-11 PROBLEM — L12.0 BULLOUS PEMPHIGOID: Status: ACTIVE | Noted: 2024-06-11

## 2024-06-11 PROBLEM — Z91.89 MULTIPLE RISK FACTORS FOR CORONARY ARTERY DISEASE: Status: ACTIVE | Noted: 2024-06-11

## 2024-06-11 PROBLEM — I10 HTN (HYPERTENSION): Status: RESOLVED | Noted: 2024-06-06 | Resolved: 2024-06-11

## 2024-07-30 DIAGNOSIS — Z91.89 MULTIPLE RISK FACTORS FOR CORONARY ARTERY DISEASE: Primary | ICD-10-CM

## 2024-09-05 ENCOUNTER — OFFICE VISIT (OUTPATIENT)
Dept: NEUROLOGY | Facility: CLINIC | Age: 69
End: 2024-09-05
Payer: MEDICARE

## 2024-09-05 VITALS
HEART RATE: 72 BPM | WEIGHT: 284.81 LBS | BODY MASS INDEX: 47.45 KG/M2 | SYSTOLIC BLOOD PRESSURE: 132 MMHG | RESPIRATION RATE: 18 BRPM | DIASTOLIC BLOOD PRESSURE: 92 MMHG | HEIGHT: 65 IN

## 2024-09-05 DIAGNOSIS — R20.0 LEFT FACIAL NUMBNESS: Primary | ICD-10-CM

## 2024-09-05 DIAGNOSIS — M54.12 CERVICAL RADICULAR PAIN: ICD-10-CM

## 2024-09-05 DIAGNOSIS — E66.01 MORBID OBESITY: ICD-10-CM

## 2024-09-05 DIAGNOSIS — M50.90 CERVICAL DISC DISEASE: ICD-10-CM

## 2024-09-05 DIAGNOSIS — G56.01 RIGHT CARPAL TUNNEL SYNDROME: ICD-10-CM

## 2024-09-05 PROCEDURE — 99999 PR PBB SHADOW E&M-EST. PATIENT-LVL V: CPT | Mod: PBBFAC,,, | Performed by: PSYCHIATRY & NEUROLOGY

## 2024-09-05 NOTE — PROGRESS NOTES
HPI: Loraine Guillen is a 69 y.o. female  with numbness and tingling in the hands    Here for 6 months follow up    Treated for  Bullous Dermatosis  inpatient with other providers since the last visit and had some drug induced  anemia    Numb hands are currently improved.     Trigger fingres on the left hand worse in the cold    Ortho follow up ongoing    Neck pain is improved currently/ PT helped prior    Discharged to PRN visits with pain management      Facial tingling bilaterally (starts on one side of the face then travels bilaterally) is still noted but not less frequently     Stress seems less to her      Weight is down 10 pounds on Trulicity       She also has Raynaud's    Review of Systems   Constitutional:  Negative for fever.   HENT:  Negative for nosebleeds.    Eyes:  Negative for double vision.   Respiratory:  Negative for hemoptysis.    Cardiovascular:  Negative for leg swelling.   Gastrointestinal:  Negative for blood in stool.   Genitourinary:  Negative for hematuria.   Musculoskeletal:  Positive for neck pain.   Skin:  Negative for rash.   Neurological:  Positive for sensory change.   Endo/Heme/Allergies:  Does not bruise/bleed easily.         I have reviewed all of this patient's past medical and surgical histories as well as family and social histories and active allergies and medications as documented in the electronic medical record.        Exam:  Gen Appearance, well developed/nourished in no apparent distress  CV: 2+ distal pulses with no edema or swelling  Neuro:  MS: Awake, alert, Sustains attention. Recent/remote memory intact, Language is full to spontaneous speech/comprehension. Fund of Knowledge is full  CN: Optic discs are flat with normal vasculature, PERRL, Extraoccular movements and visual fields are full. Normal facial sensation (splits the tuning fork on the forehead) and strength is normal, Hearing symmetric, Tongue and Palate are midline and strong. Shoulder Shrug  symmetric and strong.  Motor: Normal bulk, tone, no abnormal movements. 5/5 strength bilateral upper/lower extremities with 1+ reflexes and no clonus  Sensory: symmetric to  temp, and vibration, romberg negative  Cerebellar: Finger-nose,Heal-shin, Rapid alternating movements intact  Gait: Normal stance, no ataxia      Imaging:    CT C spine 8/2022:   Disc degenerative change including posterior spondylosis and/or bulging of the C3-4, C4-5 and C5-6 discs with uncinate hypertrophy and foraminal narrowing as detailed above.  Mild left facet arthropathy C6-7.    8/2022 CT head: No acute intracranial hemorrhage or acute calvarial fracture; normal CT of head.    2023 EMG/NCS of the arms:      Limited study due to poor patient tolerance. Suggestion of minimal right carpal tunnel syndrome is noted. No clear CTS on the left. The procedure was too limited to make any further diagnostic conclusions    8/2023 MRI C spine: Disc-osteophyte C2-3, C3-4, and C4-5 with contact with the anterior cord.     Labs:    1/2023 CMP normal  CBC chronic anemia      Assessment/Plan: Loraine Guillen is a 69 y.o. female with numbness and tingling in the hands  I recommend:     2023 EMG/NCS  of the arms: Limited study due to poor patient tolerance. Suggestion of minimal right carpal tunnel syndrome is noted. No clear CTS on the left. The procedure was too limited to make any further diagnostic conclusions  -Brace wearing helps somewhat  -Due to her neck pain and degenerative changes by CT and lack of explanation for left hand numbness by the limited study above: 2023 MRI C spine was ordered and showed disc/osteophyte changes with contact with the anterior cord  -pain management follow seeing her PRN for his as she improved post PT  -She does see ortho for  + trigger fingers on the left hand    2. Note she  also has Raynaud's per rheumatology    3. Has a history of some vague facial tingling with stress.   -However she described some ?  ""amaurosis fugax: symptoms to opthalmology prior.   CT head and Carotid US have been negative for the past few years   -Symptoms travel from one side of the face then the other not following a vascular pattern. Offered treatment to try to prevent spells.Exam shows some functional symptoms. She did not tolerate gabapentin prior due to weight gain and did not tolerate Cymbalta.. She is now reducing obesity.   Offered an anti-anxiety medication prior trial but she declined until more  weight loss. Can try deep breathing exercises instead discussed prior and this is improved    RTC 6 months            "

## 2024-09-10 PROBLEM — L13.9 BULLOUS DERMATITIS: Status: ACTIVE | Noted: 2024-09-10

## 2024-09-10 PROBLEM — L30.9 DERMATITIS: Status: ACTIVE | Noted: 2024-09-10

## 2024-10-21 ENCOUNTER — TELEPHONE (OUTPATIENT)
Dept: NEUROLOGY | Facility: CLINIC | Age: 69
End: 2024-10-21
Payer: MEDICARE

## 2024-10-21 NOTE — TELEPHONE ENCOUNTER
Patient states arm numbness and shoulder pain at night has become more frequent. Notified of available appointments, accepts 10/29/2024 at 7:30am with .

## 2024-10-21 NOTE — TELEPHONE ENCOUNTER
----- Message from Francesca sent at 10/21/2024  3:51 PM CDT -----  Contact: Patient  Loraine Guillen  MRN: [id number]  : 1955  PCP: Rubén Jean  Home Phone      [phone]  Work Phone      Not on file.  Mobile          [phone]      MESSAGE: Patient states she is struggling with her arms and would like a returned call to be scheduled for an appointment sooner than her 6 month follow up in 2025.     PHONE; [phone]

## 2024-10-30 ENCOUNTER — TELEPHONE (OUTPATIENT)
Dept: NEUROLOGY | Facility: CLINIC | Age: 69
End: 2024-10-30

## 2024-10-30 ENCOUNTER — OFFICE VISIT (OUTPATIENT)
Dept: NEUROLOGY | Facility: CLINIC | Age: 69
End: 2024-10-30
Payer: MEDICARE

## 2024-10-30 VITALS
BODY MASS INDEX: 46.32 KG/M2 | WEIGHT: 278 LBS | HEART RATE: 74 BPM | HEIGHT: 65 IN | OXYGEN SATURATION: 99 % | DIASTOLIC BLOOD PRESSURE: 88 MMHG | SYSTOLIC BLOOD PRESSURE: 114 MMHG

## 2024-10-30 DIAGNOSIS — M50.90 CERVICAL DISC DISEASE: ICD-10-CM

## 2024-10-30 DIAGNOSIS — M54.12 CERVICAL RADICULAR PAIN: ICD-10-CM

## 2024-10-30 DIAGNOSIS — G56.01 RIGHT CARPAL TUNNEL SYNDROME: Primary | ICD-10-CM

## 2024-10-30 DIAGNOSIS — G89.29 CHRONIC RIGHT SHOULDER PAIN: ICD-10-CM

## 2024-10-30 DIAGNOSIS — M25.511 CHRONIC RIGHT SHOULDER PAIN: ICD-10-CM

## 2024-10-30 PROCEDURE — 99999 PR PBB SHADOW E&M-EST. PATIENT-LVL V: CPT | Mod: PBBFAC,,, | Performed by: PSYCHIATRY & NEUROLOGY

## 2024-11-01 ENCOUNTER — TELEPHONE (OUTPATIENT)
Dept: ORTHOPEDICS | Facility: CLINIC | Age: 69
End: 2024-11-01
Payer: MEDICARE

## 2024-11-05 ENCOUNTER — TELEPHONE (OUTPATIENT)
Dept: ORTHOPEDICS | Facility: CLINIC | Age: 69
End: 2024-11-05
Payer: MEDICARE

## 2024-11-05 DIAGNOSIS — R52 PAIN: Primary | ICD-10-CM

## 2024-11-06 ENCOUNTER — HOSPITAL ENCOUNTER (OUTPATIENT)
Dept: RADIOLOGY | Facility: HOSPITAL | Age: 69
Discharge: HOME | End: 2024-11-06
Attending: PHYSICIAN ASSISTANT
Payer: MEDICARE

## 2024-11-06 ENCOUNTER — OFFICE VISIT (OUTPATIENT)
Dept: ORTHOPEDICS | Facility: CLINIC | Age: 69
End: 2024-11-06
Payer: MEDICARE

## 2024-11-06 VITALS
HEART RATE: 60 BPM | HEIGHT: 65 IN | OXYGEN SATURATION: 99 % | WEIGHT: 278.88 LBS | DIASTOLIC BLOOD PRESSURE: 76 MMHG | RESPIRATION RATE: 18 BRPM | BODY MASS INDEX: 46.46 KG/M2 | SYSTOLIC BLOOD PRESSURE: 110 MMHG

## 2024-11-06 DIAGNOSIS — M25.511 CHRONIC RIGHT SHOULDER PAIN: Primary | ICD-10-CM

## 2024-11-06 DIAGNOSIS — G89.29 CHRONIC RIGHT SHOULDER PAIN: Primary | ICD-10-CM

## 2024-11-06 DIAGNOSIS — R52 PAIN: ICD-10-CM

## 2024-11-06 DIAGNOSIS — G56.01 RIGHT CARPAL TUNNEL SYNDROME: ICD-10-CM

## 2024-11-06 PROCEDURE — 73030 X-RAY EXAM OF SHOULDER: CPT | Mod: TC,RT

## 2024-11-06 PROCEDURE — 3074F SYST BP LT 130 MM HG: CPT | Mod: CPTII,S$GLB,, | Performed by: PHYSICIAN ASSISTANT

## 2024-11-06 PROCEDURE — 3008F BODY MASS INDEX DOCD: CPT | Mod: CPTII,S$GLB,, | Performed by: PHYSICIAN ASSISTANT

## 2024-11-06 PROCEDURE — 99204 OFFICE O/P NEW MOD 45 MIN: CPT | Mod: 25,S$GLB,, | Performed by: PHYSICIAN ASSISTANT

## 2024-11-06 PROCEDURE — 3066F NEPHROPATHY DOC TX: CPT | Mod: CPTII,S$GLB,, | Performed by: PHYSICIAN ASSISTANT

## 2024-11-06 PROCEDURE — 1160F RVW MEDS BY RX/DR IN RCRD: CPT | Mod: CPTII,S$GLB,, | Performed by: PHYSICIAN ASSISTANT

## 2024-11-06 PROCEDURE — 1159F MED LIST DOCD IN RCRD: CPT | Mod: CPTII,S$GLB,, | Performed by: PHYSICIAN ASSISTANT

## 2024-11-06 PROCEDURE — 73030 X-RAY EXAM OF SHOULDER: CPT | Mod: 26,RT,, | Performed by: RADIOLOGY

## 2024-11-06 PROCEDURE — 3078F DIAST BP <80 MM HG: CPT | Mod: CPTII,S$GLB,, | Performed by: PHYSICIAN ASSISTANT

## 2024-11-06 PROCEDURE — 99999 PR PBB SHADOW E&M-EST. PATIENT-LVL V: CPT | Mod: PBBFAC,,, | Performed by: PHYSICIAN ASSISTANT

## 2024-11-06 PROCEDURE — 4010F ACE/ARB THERAPY RXD/TAKEN: CPT | Mod: CPTII,S$GLB,, | Performed by: PHYSICIAN ASSISTANT

## 2024-11-06 PROCEDURE — 20610 DRAIN/INJ JOINT/BURSA W/O US: CPT | Mod: RT,S$GLB,, | Performed by: PHYSICIAN ASSISTANT

## 2024-11-06 PROCEDURE — 3061F NEG MICROALBUMINURIA REV: CPT | Mod: CPTII,S$GLB,, | Performed by: PHYSICIAN ASSISTANT

## 2024-11-06 RX ORDER — TRIAMCINOLONE ACETONIDE 40 MG/ML
40 INJECTION, SUSPENSION INTRA-ARTICULAR; INTRAMUSCULAR ONCE
Status: COMPLETED | OUTPATIENT
Start: 2024-11-06 | End: 2024-11-06

## 2024-11-06 RX ADMIN — TRIAMCINOLONE ACETONIDE 40 MG: 40 INJECTION, SUSPENSION INTRA-ARTICULAR; INTRAMUSCULAR at 12:11

## 2024-11-06 NOTE — PROGRESS NOTES
Subjective:      Patient ID: Loraine Guillen is a 69 y.o. female.    Chief Complaint: Pain of the Right Shoulder    Review of patient's allergies indicates:   Allergen Reactions    Aspirin Hives    Clindamycin      rash    Dapsone Other (See Comments)     Hemolysis     Metoprolol tartrate Hives    Ranexa [ranolazine] Other (See Comments)     cough    Strawberry Rash    Tramadol Itching      70 yo F presents to clinic with c/o right shoulder pain x few months.    She did have this pain previously about 2 years ago, was seen at [facility] MRI showed rotator cuff tendinopathy.  She received CSI with improvement of symptoms.    Today she c/o diffuse shoulder pain that radiates into upper arm at times.  Worse at night and when she lays on her right side.  Improves some with rest.    Occasional numbness/tingling to right hand and fingers, recent EMG did show carpal tunnel.  She wears wrist braces at night with some improvement of numbness/tingling.    She does have history of neck pain, sees pain management for this.        Review of Systems   Constitutional: Negative for chills, diaphoresis and fever.   HENT:  Negative for congestion, ear discharge and ear pain.    Eyes:  Negative for blurred vision, discharge, double vision and pain.   Cardiovascular:  Negative for chest pain, claudication and cyanosis.   Respiratory:  Negative for cough, hemoptysis and shortness of breath.    Endocrine: Negative for cold intolerance and heat intolerance.   Skin:  Negative for color change, dry skin, itching and rash.   Musculoskeletal:  Positive for joint pain. Negative for arthritis, back pain, falls, gout, joint swelling, muscle weakness and neck pain.   Gastrointestinal:  Negative for abdominal pain and change in bowel habit.   Neurological:  Positive for numbness and paresthesias. Negative for brief paralysis, disturbances in coordination and dizziness.   Psychiatric/Behavioral:  Negative for altered mental status and depression.           Objective:          General    Constitutional: She is oriented to person, place, and time. She appears well-developed and well-nourished. No distress.   HENT:   Head: Atraumatic.   Eyes: EOM are normal. Right eye exhibits no discharge. Left eye exhibits no discharge.   Cardiovascular:  Normal rate.            Pulmonary/Chest: Effort normal. No respiratory distress.   Abdominal: Soft.   Neurological: She is alert and oriented to person, place, and time.   Psychiatric: She has a normal mood and affect. Her behavior is normal.         Right Shoulder Exam     Inspection/Observation   Swelling: absent  Bruising: absent  Scars: absent  Deformity: absent  Scapular Winging: absent  Scapular Dyskinesia: negative    Range of Motion   Active abduction:  normal   Passive abduction:  normal   Forward Flexion:  normal   Forward Elevation: normal  Internal rotation 0 degrees:  normal     Tests & Signs   Fisher test: negative  Impingement: negative  Lift Off Sign: negative  Belly Press: negative    Other   Sensation: normal    Comments:  Tenderness to anterior shoulder.    Left Shoulder Exam     Inspection/Observation   Swelling: absent  Bruising: absent  Scars: absent  Deformity: absent  Scapular Winging: absent  Scapular Dyskinesia: negative    Range of Motion   Active abduction:  normal   Passive abduction:  normal   Forward Flexion:  normal   Forward Elevation: normal  Internal rotation 0 degrees:  normal     Tests & Signs   Fisher test: negative  Impingement: negative  Lift Off Sign: negative  Belly Press: negative    Other   Sensation: normal       Vascular Exam       Capillary Refill  Right Hand: normal capillary refill  Left Hand: normal capillary refill                  Assessment:         Xray Right Shoulder 11/6/24:  The alignment is within normal limits. No displaced fractures identified. No evidence of lytic or blastic lesions.Mild moderate degenerative changes of the acromioclavicular joint with mild  degenerative change of the glenohumeral joint.Soft tissues are unremarkable.         Encounter Diagnosis   Name Primary?    Chronic right shoulder pain     Chronic right shoulder pain  -     Ambulatory referral/consult to Orthopedics               Plan:         The total face-to-face encounter time with this patient was 30 minutes and greater than 50% of of the encounter time was spent counseling the patient, coordinating care, and education regarding the diagnosis. We have discussed a variety of treatment options including medications, injections, physical therapy and other alternative treatments. I also explained the indications, risks and benefits of surgery. Patient chooses to proceed with CSI and home exercise program at this time.  Today, the patient chooses  a CSI and understands a minimum of 3 months time must lapse after injection, prior to a surgical procedure due to increased risk of infection.     I made the decision to obtain old records of the patient including previous notes and imaging. New imaging was ordered today of the extremity or extremities evaluated. I independently reviewed and interpreted the radiographs and/or MRIs today as well as prior imaging.      1. Injection Procedure  A time out was performed, including verification of patient ID, procedure, site and side, availability of information and equipment, review of safety issues, and agreement with consent, the procedure site was marked.    After time out was performed, the patient was prepped aseptically with chloraprep swabstick. A diagnostic and therapeutic injection of 1:3cc Kenalog/Marcaine was given under sterile technique using a 22g x 1.5 needle from the Posterior  aspect of the right Subacromial in the sitting position.      Loraine Guillen had no adverse reactions to the medication. Pain decreased. She was instructed to apply ice to the joint for 20 minutes and avoid strenuous activities for 24-36 hours following the  injection. She was warned of possible blood sugar and/or blood pressure changes during that time. Following that time, she can resume regular activities.    She was reminded to call the clinic immediately for any adverse side effects as explained in clinic today.    2. Ice compress to the affected area 2-3x a day for 15-20 minutes as needed for pain management.  3. Shoulder conditioning home exercise program. [facility] handout of exercises provided to patient.   4. Continue wrist brace as directed while sleeping.  5. RTC in 6 weeks for follow-up, sooner if needed.    Patient voices understanding of and agreement with treatment plan. All of the patient's questions were answered and the patient will contact us if she has any questions or concerns in the interim.

## 2024-12-11 PROBLEM — I20.89 EQUIVALENT ANGINA: Status: RESOLVED | Noted: 2023-11-27 | Resolved: 2024-12-11

## 2025-03-24 ENCOUNTER — OFFICE VISIT (OUTPATIENT)
Dept: NEUROLOGY | Facility: CLINIC | Age: 70
End: 2025-03-24
Payer: MEDICARE

## 2025-03-24 VITALS
HEART RATE: 76 BPM | RESPIRATION RATE: 16 BRPM | HEIGHT: 65 IN | OXYGEN SATURATION: 100 % | DIASTOLIC BLOOD PRESSURE: 84 MMHG | BODY MASS INDEX: 44.85 KG/M2 | SYSTOLIC BLOOD PRESSURE: 140 MMHG | WEIGHT: 269.19 LBS

## 2025-03-24 DIAGNOSIS — G56.01 RIGHT CARPAL TUNNEL SYNDROME: Primary | ICD-10-CM

## 2025-03-24 DIAGNOSIS — R20.0 LEFT FACIAL NUMBNESS: ICD-10-CM

## 2025-03-24 DIAGNOSIS — M50.90 CERVICAL DISC DISEASE: ICD-10-CM

## 2025-03-24 DIAGNOSIS — E66.01 MORBID OBESITY: ICD-10-CM

## 2025-03-24 PROCEDURE — 3079F DIAST BP 80-89 MM HG: CPT | Mod: CPTII,S$GLB,, | Performed by: PSYCHIATRY & NEUROLOGY

## 2025-03-24 PROCEDURE — 3077F SYST BP >= 140 MM HG: CPT | Mod: CPTII,S$GLB,, | Performed by: PSYCHIATRY & NEUROLOGY

## 2025-03-24 PROCEDURE — 3044F HG A1C LEVEL LT 7.0%: CPT | Mod: CPTII,S$GLB,, | Performed by: PSYCHIATRY & NEUROLOGY

## 2025-03-24 PROCEDURE — 3008F BODY MASS INDEX DOCD: CPT | Mod: CPTII,S$GLB,, | Performed by: PSYCHIATRY & NEUROLOGY

## 2025-03-24 PROCEDURE — 99214 OFFICE O/P EST MOD 30 MIN: CPT | Mod: S$GLB,,, | Performed by: PSYCHIATRY & NEUROLOGY

## 2025-03-24 PROCEDURE — 99999 PR PBB SHADOW E&M-EST. PATIENT-LVL IV: CPT | Mod: PBBFAC,,, | Performed by: PSYCHIATRY & NEUROLOGY

## 2025-03-24 PROCEDURE — 1159F MED LIST DOCD IN RCRD: CPT | Mod: CPTII,S$GLB,, | Performed by: PSYCHIATRY & NEUROLOGY

## 2025-03-24 PROCEDURE — 4010F ACE/ARB THERAPY RXD/TAKEN: CPT | Mod: CPTII,S$GLB,, | Performed by: PSYCHIATRY & NEUROLOGY

## 2025-03-24 NOTE — PROGRESS NOTES
HPI: Loraine Guillen is a 70 y.o. female  with numbness and tingling in the hands    Here for 6 months follow up    The patient saw ortho since the last visit for her chronic right shoulder pain and got injection and is improved      Numb hands and tingling hands is variable    Brace is helpful    Trigger finger is well controlled.     Neck pain is tolerable and only noted with certain positions      Facial tingling bilaterally (starts on one side of the face then travels bilaterally) is not noted much except with stress      Weight  is down      She also has Raynaud's    Review of Systems   Constitutional:  Negative for fever.   HENT:  Negative for nosebleeds.    Eyes:  Negative for double vision.   Respiratory:  Negative for hemoptysis.    Cardiovascular:  Negative for leg swelling.   Gastrointestinal:  Negative for blood in stool.   Genitourinary:  Negative for hematuria.   Musculoskeletal:  Positive for neck pain.   Skin:  Negative for rash.   Neurological:  Positive for sensory change.   Endo/Heme/Allergies:  Does not bruise/bleed easily.         I have reviewed all of this patient's past medical and surgical histories as well as family and social histories and active allergies and medications as documented in the electronic medical record.        Exam:  Gen Appearance, well developed/nourished in no apparent distress  CV: 2+ distal pulses with no edema or swelling  Neuro:  MS: Awake, alert, Sustains attention. Recent/remote memory intact, Language is full to spontaneous speech/comprehension. Fund of Knowledge is full  CN: Optic discs are flat with normal vasculature, PERRL, Extraoccular movements and visual fields are full. Normal facial sensation (splits the tuning fork on the forehead) and strength is normal, Hearing symmetric, Tongue and Palate are midline and strong. Shoulder Shrug symmetric and strong.  Motor: Normal bulk, tone, no abnormal movements. 5/5 strength bilateral upper/lower extremities  with 1+ reflexes and no clonus  Sensory: symmetric to  temp, and vibration, romberg negative  Cerebellar: Finger-nose,Heal-shin, Rapid alternating movements intact  Gait: Normal stance, no ataxia  +Tinel's sign on the right wrist      Imaging:    CT C spine 8/2022:   Disc degenerative change including posterior spondylosis and/or bulging of the C3-4, C4-5 and C5-6 discs with uncinate hypertrophy and foraminal narrowing as detailed above.  Mild left facet arthropathy C6-7.    8/2022 CT head: No acute intracranial hemorrhage or acute calvarial fracture; normal CT of head.    2023 EMG/NCS of the arms:      Limited study due to poor patient tolerance. Suggestion of minimal right carpal tunnel syndrome is noted. No clear CTS on the left. The procedure was too limited to make any further diagnostic conclusions    8/2023 MRI C spine: Disc-osteophyte C2-3, C3-4, and C4-5 with contact with the anterior cord.     Labs:    1/2023 CMP normal  CBC chronic anemia      Assessment/Plan: Loraine Guillen is a 70 y.o. female with numbness and tingling in the hands  I recommend:     2023 EMG/NCS  of the arms: Limited study due to poor patient tolerance. Suggestion of minimal right carpal tunnel syndrome is noted. No clear CTS on the left. The procedure was too limited to make any further diagnostic conclusions  -Brace wearing helps somewhat  -She has increased hand numbness. I suggested updating EMG/NCS but she declined.  She resumed nightly brace wearing instead and improved  -Due to her neck pain and degenerative changes by CT and lack of explanation for left hand numbness by the limited study above: 2023 MRI C spine was ordered and showed disc/osteophyte changes with contact with the anterior cord  -pain management seeing her PRN for his as she improved post PT  -She does see ortho for  + trigger fingers on the left hand and now chronic right shoulder pain    2. Note she  also has Raynaud's per rheumatology    3. Has a history of  "some vague facial tingling with stress.   -However she described some ? "amaurosis fugax" symptoms to opthalmology prior.   -CT head and Carotid US have been negative for the past few years   -Symptoms travel from one side of the face then the other not following a vascular or pattern. Offered treatment to try to prevent spells.Exam shows some functional symptoms. She did not tolerate gabapentin prior due to weight gain and did not tolerate Cymbalta.. She is now reducing obesity.   Offered an anti-anxiety medication prior trial prior but she declined until more  weight loss. Can try deep breathing exercises instead discussed prior and this is improved    RTC 6 months per her request                "

## 2025-08-15 ENCOUNTER — PATIENT OUTREACH (OUTPATIENT)
Dept: ADMINISTRATIVE | Facility: HOSPITAL | Age: 70
End: 2025-08-15
Payer: MEDICARE